# Patient Record
Sex: MALE | Race: ASIAN | NOT HISPANIC OR LATINO
[De-identification: names, ages, dates, MRNs, and addresses within clinical notes are randomized per-mention and may not be internally consistent; named-entity substitution may affect disease eponyms.]

---

## 2017-09-25 ENCOUNTER — APPOINTMENT (OUTPATIENT)
Dept: CARDIOLOGY | Facility: CLINIC | Age: 62
End: 2017-09-25

## 2017-11-14 ENCOUNTER — INPATIENT (INPATIENT)
Facility: HOSPITAL | Age: 62
LOS: 0 days | Discharge: HOME | DRG: 287 | End: 2017-11-15
Attending: INTERNAL MEDICINE | Admitting: HOSPITALIST
Payer: COMMERCIAL

## 2017-11-14 VITALS
OXYGEN SATURATION: 99 % | HEART RATE: 71 BPM | SYSTOLIC BLOOD PRESSURE: 146 MMHG | RESPIRATION RATE: 16 BRPM | TEMPERATURE: 98 F | DIASTOLIC BLOOD PRESSURE: 90 MMHG

## 2017-11-14 DIAGNOSIS — I25.10 ATHEROSCLEROTIC HEART DISEASE OF NATIVE CORONARY ARTERY WITHOUT ANGINA PECTORIS: Chronic | ICD-10-CM

## 2017-11-14 DIAGNOSIS — I20.8 OTHER FORMS OF ANGINA PECTORIS: ICD-10-CM

## 2017-11-14 LAB
ALBUMIN SERPL ELPH-MCNC: 4.1 G/DL — SIGNIFICANT CHANGE UP (ref 3.3–5)
ALP SERPL-CCNC: 83 U/L — SIGNIFICANT CHANGE UP (ref 40–120)
ALT FLD-CCNC: 15 U/L RC — SIGNIFICANT CHANGE UP (ref 10–45)
ANION GAP SERPL CALC-SCNC: 13 MMOL/L — SIGNIFICANT CHANGE UP (ref 5–17)
APTT BLD: 39.7 SEC — HIGH (ref 27.5–37.4)
AST SERPL-CCNC: 20 U/L — SIGNIFICANT CHANGE UP (ref 10–40)
BASOPHILS # BLD AUTO: 0 K/UL — SIGNIFICANT CHANGE UP (ref 0–0.2)
BASOPHILS NFR BLD AUTO: 0.3 % — SIGNIFICANT CHANGE UP (ref 0–2)
BILIRUB SERPL-MCNC: 1 MG/DL — SIGNIFICANT CHANGE UP (ref 0.2–1.2)
BUN SERPL-MCNC: 14 MG/DL — SIGNIFICANT CHANGE UP (ref 7–23)
CALCIUM SERPL-MCNC: 9 MG/DL — SIGNIFICANT CHANGE UP (ref 8.4–10.5)
CHLORIDE SERPL-SCNC: 101 MMOL/L — SIGNIFICANT CHANGE UP (ref 96–108)
CK MB CFR SERPL CALC: <1 NG/ML — SIGNIFICANT CHANGE UP (ref 0–6.7)
CK SERPL-CCNC: 74 U/L — SIGNIFICANT CHANGE UP (ref 30–200)
CO2 SERPL-SCNC: 25 MMOL/L — SIGNIFICANT CHANGE UP (ref 22–31)
CREAT SERPL-MCNC: 1.03 MG/DL — SIGNIFICANT CHANGE UP (ref 0.5–1.3)
EOSINOPHIL # BLD AUTO: 0.2 K/UL — SIGNIFICANT CHANGE UP (ref 0–0.5)
EOSINOPHIL NFR BLD AUTO: 2.1 % — SIGNIFICANT CHANGE UP (ref 0–6)
GLUCOSE SERPL-MCNC: 121 MG/DL — HIGH (ref 70–99)
HCT VFR BLD CALC: 42.8 % — SIGNIFICANT CHANGE UP (ref 39–50)
HGB BLD-MCNC: 14.3 G/DL — SIGNIFICANT CHANGE UP (ref 13–17)
INR BLD: 1.9 RATIO — HIGH (ref 0.88–1.16)
LYMPHOCYTES # BLD AUTO: 19.5 % — SIGNIFICANT CHANGE UP (ref 13–44)
LYMPHOCYTES # BLD AUTO: 2 K/UL — SIGNIFICANT CHANGE UP (ref 1–3.3)
MAGNESIUM SERPL-MCNC: 1.6 MG/DL — SIGNIFICANT CHANGE UP (ref 1.6–2.6)
MCHC RBC-ENTMCNC: 31.8 PG — SIGNIFICANT CHANGE UP (ref 27–34)
MCHC RBC-ENTMCNC: 33.6 GM/DL — SIGNIFICANT CHANGE UP (ref 32–36)
MCV RBC AUTO: 94.7 FL — SIGNIFICANT CHANGE UP (ref 80–100)
MONOCYTES # BLD AUTO: 0.8 K/UL — SIGNIFICANT CHANGE UP (ref 0–0.9)
MONOCYTES NFR BLD AUTO: 7.7 % — SIGNIFICANT CHANGE UP (ref 2–14)
NEUTROPHILS # BLD AUTO: 7.4 K/UL — SIGNIFICANT CHANGE UP (ref 1.8–7.4)
NEUTROPHILS NFR BLD AUTO: 70.4 % — SIGNIFICANT CHANGE UP (ref 43–77)
NT-PROBNP SERPL-SCNC: 637 PG/ML — HIGH (ref 0–300)
PHOSPHATE SERPL-MCNC: 3.4 MG/DL — SIGNIFICANT CHANGE UP (ref 2.5–4.5)
PLATELET # BLD AUTO: 179 K/UL — SIGNIFICANT CHANGE UP (ref 150–400)
POTASSIUM SERPL-MCNC: 3.9 MMOL/L — SIGNIFICANT CHANGE UP (ref 3.5–5.3)
POTASSIUM SERPL-SCNC: 3.9 MMOL/L — SIGNIFICANT CHANGE UP (ref 3.5–5.3)
PROT SERPL-MCNC: 7.3 G/DL — SIGNIFICANT CHANGE UP (ref 6–8.3)
PROTHROM AB SERPL-ACNC: 21 SEC — HIGH (ref 9.8–12.7)
RBC # BLD: 4.52 M/UL — SIGNIFICANT CHANGE UP (ref 4.2–5.8)
RBC # FLD: 12.2 % — SIGNIFICANT CHANGE UP (ref 10.3–14.5)
SODIUM SERPL-SCNC: 139 MMOL/L — SIGNIFICANT CHANGE UP (ref 135–145)
TROPONIN T SERPL-MCNC: <0.01 NG/ML — SIGNIFICANT CHANGE UP (ref 0–0.06)
WBC # BLD: 10.4 K/UL — SIGNIFICANT CHANGE UP (ref 3.8–10.5)
WBC # FLD AUTO: 10.4 K/UL — SIGNIFICANT CHANGE UP (ref 3.8–10.5)

## 2017-11-14 PROCEDURE — 99285 EMERGENCY DEPT VISIT HI MDM: CPT

## 2017-11-14 PROCEDURE — 99406 BEHAV CHNG SMOKING 3-10 MIN: CPT

## 2017-11-14 PROCEDURE — 93308 TTE F-UP OR LMTD: CPT | Mod: 26

## 2017-11-14 PROCEDURE — 99223 1ST HOSP IP/OBS HIGH 75: CPT | Mod: 25

## 2017-11-14 PROCEDURE — 71020: CPT | Mod: 26

## 2017-11-14 RX ORDER — MAGNESIUM OXIDE 400 MG ORAL TABLET 241.3 MG
400 TABLET ORAL ONCE
Qty: 0 | Refills: 0 | Status: COMPLETED | OUTPATIENT
Start: 2017-11-14 | End: 2017-11-14

## 2017-11-14 RX ORDER — ASPIRIN/CALCIUM CARB/MAGNESIUM 324 MG
324 TABLET ORAL DAILY
Qty: 0 | Refills: 0 | Status: DISCONTINUED | OUTPATIENT
Start: 2017-11-14 | End: 2017-11-15

## 2017-11-14 RX ORDER — POTASSIUM CHLORIDE 20 MEQ
20 PACKET (EA) ORAL ONCE
Qty: 0 | Refills: 0 | Status: COMPLETED | OUTPATIENT
Start: 2017-11-14 | End: 2017-11-14

## 2017-11-14 RX ORDER — TICAGRELOR 90 MG/1
90 TABLET ORAL
Qty: 0 | Refills: 0 | Status: DISCONTINUED | OUTPATIENT
Start: 2017-11-14 | End: 2017-11-15

## 2017-11-14 RX ORDER — FUROSEMIDE 40 MG
20 TABLET ORAL ONCE
Qty: 0 | Refills: 0 | Status: COMPLETED | OUTPATIENT
Start: 2017-11-14 | End: 2017-11-14

## 2017-11-14 RX ORDER — SODIUM CHLORIDE 9 MG/ML
3 INJECTION INTRAMUSCULAR; INTRAVENOUS; SUBCUTANEOUS ONCE
Qty: 0 | Refills: 0 | Status: COMPLETED | OUTPATIENT
Start: 2017-11-14 | End: 2017-11-14

## 2017-11-14 RX ADMIN — Medication 20 MILLIEQUIVALENT(S): at 19:58

## 2017-11-14 RX ADMIN — Medication 324 MILLIGRAM(S): at 19:58

## 2017-11-14 RX ADMIN — MAGNESIUM OXIDE 400 MG ORAL TABLET 400 MILLIGRAM(S): 241.3 TABLET ORAL at 20:08

## 2017-11-14 RX ADMIN — Medication 20 MILLIGRAM(S): at 21:50

## 2017-11-14 RX ADMIN — SODIUM CHLORIDE 3 MILLILITER(S): 9 INJECTION INTRAMUSCULAR; INTRAVENOUS; SUBCUTANEOUS at 20:08

## 2017-11-14 RX ADMIN — TICAGRELOR 90 MILLIGRAM(S): 90 TABLET ORAL at 19:58

## 2017-11-14 NOTE — ED PROVIDER NOTE - MEDICAL DECISION MAKING DETAILS
61 y/o male h/o CAD with multiple stents presenting with worsening angina and afib. pt on xarelto. no pleuritic pain and as pt on xarelto less likely acute PE. ekgshows now evidence of acute st changes. d/w cards fellow will admit for catherization tomorrow. will admit to tele

## 2017-11-14 NOTE — ED ADULT NURSE NOTE - OBJECTIVE STATEMENT
62y m pt c/o chest pain and neck tightness x  1weelk; pt had nuclear stress test x 1 week; was due for further testing but pain not alleviated; pt states feels exactly like last year when had stents placed; ekg done; pt in afib; aox3; no resp distress; no abd pain; no n/v/d; no fever/chills; + bilat le edema, pt states no worse than normal; no numbness/tingling; pt ambulates without assist; skin warm dry intact; pt placed on cardiac monitor; afib; iv placed; labs drawn per md order; safety and comfort maintained

## 2017-11-14 NOTE — ED PROVIDER NOTE - OBJECTIVE STATEMENT
61 y/o M pt with PMHx of DM, PSHx of cardiac stents x6 c/o neck tightness and SOB x1 week with walking. Sx is better when he stops walking. Dr. Moran wanted to complete a stress test. Pt went into A-fib during the test. Pt started Xarelto 11/9.  Also states leg swelling which is improved in the mornings. Pt is a dentist. Denies fever, chills, vomiting, diarrhea or any other complaints.  Current medication: Brilinta,   Cardiologist; Dr. Cameron Moran.

## 2017-11-14 NOTE — CONSULT NOTE ADULT - ASSESSMENT
62M with HTN, DM, CRISS on CPAP, CAD s/p RCA LIU x 6 in 2011 and most recently in 2016 OM1 LIU with chest pain and Afib.    -- NPO p MN for cath  -- continue asa  -- continue brillinta  -- hold xarelto  -- reasonable to start heparin gtt prior to cath given low INR and afib  -- monitor on tele  -- replete K>4, Mg>2  -- continue atorvastatin 80 mg    Dickson Dent MD 62M with HTN, DM, CRISS on CPAP, CAD s/p RCA LIU x 6 in 2011 and most recently in 2016 OM1 LIU with chest pain and Afib.    -- consider CT chest for further delineation of CXR findings  -- consider TB isolation given recent [place] trip  -- NPO p MN for cath  -- continue asa  -- continue brillinta  -- hold xarelto  -- reasonable to start heparin gtt prior to cath given low INR and afib  -- monitor on tele  -- replete K>4, Mg>2  -- continue atorvastatin 80 mg    Dickson Dent MD

## 2017-11-14 NOTE — CONSULT NOTE ADULT - SUBJECTIVE AND OBJECTIVE BOX
Patient seen and evaluated @ 8 PM  Chief Complaint: Chest pain    HPI:  62M with HTN, DM, CRISS on CPAP, CAD s/p RCA LIU x 6 in 2011 and most recently in 2016 OM1 LIU  presents with persistent chest heaviness for 3 weeks now worsening associated with palpitations and SOB. Patient also endorses recent diagnosis of afib for which he started xarelto on 11/9 with improvement in symptoms    PMH:   CRISS (obstructive sleep apnea)  Hyperlipidemia, unspecified hyperlipidemia type  Essential hypertension  Coronary artery disease involving native coronary artery of native heart, angina presence unspecified  Type 2 diabetes mellitus without complication, without long-term current use of insulin  Diphtheria  Typhoid Fever  Obstructive Sleep Apnea  Obesity, unspecified  Hyperlipemia  Hypertension    PSH:   CAD (coronary artery disease)  No significant past surgical history    Home meds:  Metoprolol 100 mg  Brilinta 90 mg  Valsartan 160 mg  Metformin  Diltiazem  Atorvastatin  Xarelto    Medications:   aspirin  chewable 324 milliGRAM(s) Oral daily  ticagrelor 90 milliGRAM(s) Oral two times a day    Allergies:  No Known Drug Allergies  shellfish (Other)    FAMILY HISTORY:  Family history of stroke  Family history of hypertension  Family history of acute myocardial infarction: x4    Social History:  Current smoker    Review of Systems:  Constitutional: [ ] Fever [ ] Chills [ ] Fatigue [ ] Weight change   HEENT: [ ] Blurred vision [ ] Eye Pain [ ] Headache [ ] Runny nose [ ] Sore Throat   Respiratory: [ ] Cough [ ] Wheezing [ ] Shortness of breath  Cardiovascular: [ ] Chest Pain [ ] Palpitations [ ] LOPEZ [ ] PND [ ] Orthopnea  Gastrointestinal: [ ] Abdominal Pain [ ] Diarrhea [ ] Constipation [ ] Hemorrhoids [ ] Nausea [ ] Vomiting  Genitourinary: [ ] Nocturia [ ] Dysuria [ ] Incontinence  Extremities: [ ] Swelling [ ] Joint Pain  Neurologic: [ ] Focal deficit [ ] Paresthesias [ ] Syncope  Lymphatic: [ ] Swelling [ ] Lymphadenopathy   Skin: [ ] Rash [ ] Ecchymoses [ ] Wounds [ ] Lesions  Psychiatry: [ ] Depression [ ] Suicidal/Homicidal Ideation [ ] Anxiety [ ] Sleep Disturbances  [ ] 10 point review of systems is otherwise negative except as mentioned above            [ ]Unable to obtain    Physical Exam:  T(C): 36.8 (11-14-17 @ 19:10), Max: 36.8 (11-14-17 @ 18:58)  HR: 74 (11-14-17 @ 19:10) (71 - 74)  BP: 124/79 (11-14-17 @ 19:10) (124/79 - 146/79)  RR: 16 (11-14-17 @ 19:10) (16 - 16)  SpO2: 99% (11-14-17 @ 19:10) (98% - 99%)  Wt(kg): --    Daily     Daily     Appearance: NAD  Eyes: PERRL, EOMI  HENT: Normal oral mus[place]a, NC/AT  Cardiovascular: normal S1 and S2, irr irr, no m/r/g, no edema, normal JVP  Respiratory: Clear to auscultation bilaterally  Gastrointestinal: Soft, non-tender, non-distended, BS+  Musculoskeletal: No clubbing, no joint deformity   Neurologic: Non-focal  Lymphatic: No lymphadenopathy  Psychiatry: AAOx3, mood & affect appropriate  Skin: No rashes, no ecchymoses, no cyanosis    Cardiovascular Diagnostic Testing:  ECG: afib 70 bpm    Echo:  11/2016  EF 65%  1. Mild segmental abnormalities with normal left  ventricular systolic function. The mid to basal lateral  wall are hypokinetic.  No left ventricular thrombus.  2. Mild diastolic dysfunction (Stage I).  3. The right ventricle is not well visualized; grossly  normal right ventricular systolic function.  4. No pericardial effusion seen.    Stress Testing:  none    Cath:  11/11/2016  VENTRICLES: EF estimated was 49 %.  CORONARY VESSELS: The coronary circulation is right dominant.  LM:   --  LM: Normal.  LAD:   --  Proximal LAD: There was a 10 % stenosis at the site ofa prior  stent.  CX:   --  OM1: There was a 90 % stenosis.  RCA:   --  Proximal RCA: There was a 10 % stenosis at the site of a prior  stent.  --  Distal RCA: There was a 10 % stenosis at the site of a prior stent.  COMPLICATIONS: There were no complications.  DIAGNOSTIC RECOMMENDATIONS: ASA and Plavix for 1 year.  INTERVENTIONAL RECOMMENDATIONS: ASA and Plavix for 1 year.    Interpretation of Telemetry:  none    Imaging:  CXR with bilateral patchy opacities    Labs:                        14.3   10.4  )-----------( 179      ( 14 Nov 2017 18:44 )             42.8     11-14    139  |  101  |  14  ----------------------------<  121<H>  3.9   |  25  |  1.03    Ca    9.0      14 Nov 2017 18:44  Phos  3.4     11-14  Mg     1.6     11-14    TPro  7.3  /  Alb  4.1  /  TBili  1.0  /  DBili  x   /  AST  20  /  ALT  15  /  AlkPhos  83  11-14    PT/INR - ( 14 Nov 2017 18:44 )   PT: 21.0 sec;   INR: 1.90 ratio         PTT - ( 14 Nov 2017 18:44 )  PTT:39.7 sec  CARDIAC MARKERS ( 14 Nov 2017 18:44 )  x     / <0.01 ng/mL / 74 U/L / x     / <1.0 ng/mL      Serum Pro-Brain Natriuretic Peptide: 637 pg/mL (11-14 @ 18:44) Patient seen and evaluated @ 8 PM  Chief Complaint: Chest pain    HPI:  62M with HTN, DM, CRISS on CPAP, CAD s/p LIU to PDA, pRCA, mLAD, D1 in 2011 and most recently in 2016 OM1 LIU  presents with persistent chest heaviness for 3 weeks now worsening associated with palpitations and SOB. Patient also endorses recent diagnosis of afib for which he started xarelto on 11/9 with improvement in symptoms    PMH:   CRISS (obstructive sleep apnea)  Hyperlipidemia, unspecified hyperlipidemia type  Essential hypertension  Coronary artery disease involving native coronary artery of native heart, angina presence unspecified  Type 2 diabetes mellitus without complication, without long-term current use of insulin  Diphtheria  Typhoid Fever  Obstructive Sleep Apnea  Obesity, unspecified  Hyperlipemia  Hypertension    PSH:   CAD (coronary artery disease)  No significant past surgical history    Home meds:  Metoprolol 100 mg  Brilinta 90 mg  Valsartan 160 mg  Metformin  Diltiazem  Atorvastatin  Xarelto    Medications:   aspirin  chewable 324 milliGRAM(s) Oral daily  ticagrelor 90 milliGRAM(s) Oral two times a day    Allergies:  No Known Drug Allergies  shellfish (Other)    FAMILY HISTORY:  Family history of stroke  Family history of hypertension  Family history of acute myocardial infarction: x4    Social History:  Current smoker    Review of Systems:  Constitutional: [ ] Fever [ ] Chills [ ] Fatigue [ ] Weight change   HEENT: [ ] Blurred vision [ ] Eye Pain [ ] Headache [ ] Runny nose [ ] Sore Throat   Respiratory: [ ] Cough [ ] Wheezing [ ] Shortness of breath  Cardiovascular: [ ] Chest Pain [ ] Palpitations [ ] LOPEZ [ ] PND [ ] Orthopnea  Gastrointestinal: [ ] Abdominal Pain [ ] Diarrhea [ ] Constipation [ ] Hemorrhoids [ ] Nausea [ ] Vomiting  Genitourinary: [ ] Nocturia [ ] Dysuria [ ] Incontinence  Extremities: [ ] Swelling [ ] Joint Pain  Neurologic: [ ] Focal deficit [ ] Paresthesias [ ] Syncope  Lymphatic: [ ] Swelling [ ] Lymphadenopathy   Skin: [ ] Rash [ ] Ecchymoses [ ] Wounds [ ] Lesions  Psychiatry: [ ] Depression [ ] Suicidal/Homicidal Ideation [ ] Anxiety [ ] Sleep Disturbances  [ ] 10 point review of systems is otherwise negative except as mentioned above            [ ]Unable to obtain    Physical Exam:  T(C): 36.8 (11-14-17 @ 19:10), Max: 36.8 (11-14-17 @ 18:58)  HR: 74 (11-14-17 @ 19:10) (71 - 74)  BP: 124/79 (11-14-17 @ 19:10) (124/79 - 146/79)  RR: 16 (11-14-17 @ 19:10) (16 - 16)  SpO2: 99% (11-14-17 @ 19:10) (98% - 99%)  Wt(kg): --    Daily     Daily     Appearance: NAD  Eyes: PERRL, EOMI  HENT: Normal oral mus[place]a, NC/AT  Cardiovascular: normal S1 and S2, irr irr, no m/r/g, no edema, normal JVP  Respiratory: Clear to auscultation bilaterally  Gastrointestinal: Soft, non-tender, non-distended, BS+  Musculoskeletal: No clubbing, no joint deformity   Neurologic: Non-focal  Lymphatic: No lymphadenopathy  Psychiatry: AAOx3, mood & affect appropriate  Skin: No rashes, no ecchymoses, no cyanosis    Cardiovascular Diagnostic Testing:  ECG: afib 70 bpm    Echo:  11/2016  EF 65%  1. Mild segmental abnormalities with normal left  ventricular systolic function. The mid to basal lateral  wall are hypokinetic.  No left ventricular thrombus.  2. Mild diastolic dysfunction (Stage I).  3. The right ventricle is not well visualized; grossly  normal right ventricular systolic function.  4. No pericardial effusion seen.    Stress Testing:  none    Cath:  11/11/2016  VENTRICLES: EF estimated was 49 %.  CORONARY VESSELS: The coronary circulation is right dominant.  LM:   --  LM: Normal.  LAD:   --  Proximal LAD: There was a 10 % stenosis at the site ofa prior  stent.  CX:   --  OM1: There was a 90 % stenosis.  RCA:   --  Proximal RCA: There was a 10 % stenosis at the site of a prior  stent.  --  Distal RCA: There was a 10 % stenosis at the site of a prior stent.  COMPLICATIONS: There were no complications.  DIAGNOSTIC RECOMMENDATIONS: ASA and Plavix for 1 year.  INTERVENTIONAL RECOMMENDATIONS: ASA and Plavix for 1 year.    Interpretation of Telemetry:  none    Imaging:  CXR with bilateral patchy opacities    Labs:                        14.3   10.4  )-----------( 179      ( 14 Nov 2017 18:44 )             42.8     11-14    139  |  101  |  14  ----------------------------<  121<H>  3.9   |  25  |  1.03    Ca    9.0      14 Nov 2017 18:44  Phos  3.4     11-14  Mg     1.6     11-14    TPro  7.3  /  Alb  4.1  /  TBili  1.0  /  DBili  x   /  AST  20  /  ALT  15  /  AlkPhos  83  11-14    PT/INR - ( 14 Nov 2017 18:44 )   PT: 21.0 sec;   INR: 1.90 ratio         PTT - ( 14 Nov 2017 18:44 )  PTT:39.7 sec  CARDIAC MARKERS ( 14 Nov 2017 18:44 )  x     / <0.01 ng/mL / 74 U/L / x     / <1.0 ng/mL      Serum Pro-Brain Natriuretic Peptide: 637 pg/mL (11-14 @ 18:44)

## 2017-11-14 NOTE — ED ADULT NURSE REASSESSMENT NOTE - NS ED NURSE REASSESS COMMENT FT1
Patient sitting in [place] on tele monitor. He states his pain is always there, but has neck pain with walking around. He received medications. Spoke with MD Almeida and she states patient is allowed to eat. He is to be admitted. Will continue to monitor.

## 2017-11-14 NOTE — ED PROVIDER NOTE - CARE PLAN
Principal Discharge DX:	Angina effort  Secondary Diagnosis:	Shortness of breath  Secondary Diagnosis:	Atrial fibrillation

## 2017-11-14 NOTE — ED PROVIDER NOTE - PMH
Coronary artery disease involving native coronary artery of native heart, angina presence unspecified    Diphtheria  at 13yo  Essential hypertension    Hyperlipemia    Hyperlipidemia, unspecified hyperlipidemia type    Hypertension    Obesity, unspecified    Obstructive Sleep Apnea    CRISS (obstructive sleep apnea)    Type 2 diabetes mellitus without complication, without long-term current use of insulin    Typhoid Fever  at 21yo

## 2017-11-14 NOTE — ED ADULT NURSE NOTE - PMH
Coronary artery disease involving native coronary artery of native heart, angina presence unspecified    Diphtheria  at 15yo  Essential hypertension    Hyperlipemia    Hyperlipidemia, unspecified hyperlipidemia type    Hypertension    Obesity, unspecified    Obstructive Sleep Apnea    CRISS (obstructive sleep apnea)    Type 2 diabetes mellitus without complication, without long-term current use of insulin    Typhoid Fever  at 19yo

## 2017-11-14 NOTE — ED PROVIDER NOTE - PROGRESS NOTE DETAILS
spoke with cardiology fellow and made aware of pt's visit d/w cards fellow. pt to be cath tomorrow. pt can take brilinta this evening but would recommend holding xarelto.admit to medicine Attending Juan Pablo: pt states pcp is not with [facility]. will admit to unattached

## 2017-11-15 ENCOUNTER — TRANSCRIPTION ENCOUNTER (OUTPATIENT)
Age: 62
End: 2017-11-15

## 2017-11-15 VITALS — DIASTOLIC BLOOD PRESSURE: 87 MMHG | HEART RATE: 84 BPM | SYSTOLIC BLOOD PRESSURE: 129 MMHG

## 2017-11-15 DIAGNOSIS — Z29.9 ENCOUNTER FOR PROPHYLACTIC MEASURES, UNSPECIFIED: ICD-10-CM

## 2017-11-15 DIAGNOSIS — I47.2 VENTRICULAR TACHYCARDIA: ICD-10-CM

## 2017-11-15 DIAGNOSIS — I25.10 ATHEROSCLEROTIC HEART DISEASE OF NATIVE CORONARY ARTERY WITHOUT ANGINA PECTORIS: ICD-10-CM

## 2017-11-15 DIAGNOSIS — I10 ESSENTIAL (PRIMARY) HYPERTENSION: ICD-10-CM

## 2017-11-15 DIAGNOSIS — I20.0 UNSTABLE ANGINA: ICD-10-CM

## 2017-11-15 DIAGNOSIS — F17.210 NICOTINE DEPENDENCE, CIGARETTES, UNCOMPLICATED: ICD-10-CM

## 2017-11-15 DIAGNOSIS — R91.8 OTHER NONSPECIFIC ABNORMAL FINDING OF LUNG FIELD: ICD-10-CM

## 2017-11-15 DIAGNOSIS — I48.0 PAROXYSMAL ATRIAL FIBRILLATION: ICD-10-CM

## 2017-11-15 DIAGNOSIS — E11.65 TYPE 2 DIABETES MELLITUS WITH HYPERGLYCEMIA: ICD-10-CM

## 2017-11-15 LAB
ALBUMIN SERPL ELPH-MCNC: 3.5 G/DL — SIGNIFICANT CHANGE UP (ref 3.3–5)
ALP SERPL-CCNC: 74 U/L — SIGNIFICANT CHANGE UP (ref 40–120)
ALT FLD-CCNC: 14 U/L — SIGNIFICANT CHANGE UP (ref 10–45)
ANION GAP SERPL CALC-SCNC: 16 MMOL/L — SIGNIFICANT CHANGE UP (ref 5–17)
APTT BLD: 171.5 SEC — CRITICAL HIGH (ref 27.5–37.4)
AST SERPL-CCNC: 32 U/L — SIGNIFICANT CHANGE UP (ref 10–40)
BASOPHILS # BLD AUTO: 0.02 K/UL — SIGNIFICANT CHANGE UP (ref 0–0.2)
BASOPHILS NFR BLD AUTO: 0.2 % — SIGNIFICANT CHANGE UP (ref 0–2)
BILIRUB SERPL-MCNC: 1.1 MG/DL — SIGNIFICANT CHANGE UP (ref 0.2–1.2)
BUN SERPL-MCNC: 11 MG/DL — SIGNIFICANT CHANGE UP (ref 7–23)
CALCIUM SERPL-MCNC: 8.9 MG/DL — SIGNIFICANT CHANGE UP (ref 8.4–10.5)
CHLORIDE SERPL-SCNC: 101 MMOL/L — SIGNIFICANT CHANGE UP (ref 96–108)
CK MB BLD-MCNC: 1.4 % — SIGNIFICANT CHANGE UP (ref 0–3.5)
CK MB CFR SERPL CALC: 1 NG/ML — SIGNIFICANT CHANGE UP (ref 0–6.7)
CK SERPL-CCNC: 72 U/L — SIGNIFICANT CHANGE UP (ref 30–200)
CO2 SERPL-SCNC: 20 MMOL/L — LOW (ref 22–31)
CREAT SERPL-MCNC: 0.81 MG/DL — SIGNIFICANT CHANGE UP (ref 0.5–1.3)
EOSINOPHIL # BLD AUTO: 0.26 K/UL — SIGNIFICANT CHANGE UP (ref 0–0.5)
EOSINOPHIL NFR BLD AUTO: 2.7 % — SIGNIFICANT CHANGE UP (ref 0–6)
GLUCOSE BLDC GLUCOMTR-MCNC: 118 MG/DL — HIGH (ref 70–99)
GLUCOSE BLDC GLUCOMTR-MCNC: 164 MG/DL — HIGH (ref 70–99)
GLUCOSE SERPL-MCNC: 107 MG/DL — HIGH (ref 70–99)
HBA1C BLD-MCNC: 7.7 % — HIGH (ref 4–5.6)
HCT VFR BLD CALC: 40.2 % — SIGNIFICANT CHANGE UP (ref 39–50)
HCT VFR BLD CALC: 44.5 % — SIGNIFICANT CHANGE UP (ref 39–50)
HGB BLD-MCNC: 13 G/DL — SIGNIFICANT CHANGE UP (ref 13–17)
HGB BLD-MCNC: 14.7 G/DL — SIGNIFICANT CHANGE UP (ref 13–17)
IMM GRANULOCYTES NFR BLD AUTO: 0.4 % — SIGNIFICANT CHANGE UP (ref 0–1.5)
LYMPHOCYTES # BLD AUTO: 2.23 K/UL — SIGNIFICANT CHANGE UP (ref 1–3.3)
LYMPHOCYTES # BLD AUTO: 23.2 % — SIGNIFICANT CHANGE UP (ref 13–44)
MAGNESIUM SERPL-MCNC: 1.8 MG/DL — SIGNIFICANT CHANGE UP (ref 1.6–2.6)
MCHC RBC-ENTMCNC: 29.3 PG — SIGNIFICANT CHANGE UP (ref 27–34)
MCHC RBC-ENTMCNC: 31.3 PG — SIGNIFICANT CHANGE UP (ref 27–34)
MCHC RBC-ENTMCNC: 32.3 GM/DL — SIGNIFICANT CHANGE UP (ref 32–36)
MCHC RBC-ENTMCNC: 33 GM/DL — SIGNIFICANT CHANGE UP (ref 32–36)
MCV RBC AUTO: 90.7 FL — SIGNIFICANT CHANGE UP (ref 80–100)
MCV RBC AUTO: 94.7 FL — SIGNIFICANT CHANGE UP (ref 80–100)
MONOCYTES # BLD AUTO: 0.83 K/UL — SIGNIFICANT CHANGE UP (ref 0–0.9)
MONOCYTES NFR BLD AUTO: 8.6 % — SIGNIFICANT CHANGE UP (ref 2–14)
NEUTROPHILS # BLD AUTO: 6.25 K/UL — SIGNIFICANT CHANGE UP (ref 1.8–7.4)
NEUTROPHILS NFR BLD AUTO: 64.9 % — SIGNIFICANT CHANGE UP (ref 43–77)
PHOSPHATE SERPL-MCNC: 3.6 MG/DL — SIGNIFICANT CHANGE UP (ref 2.5–4.5)
PLATELET # BLD AUTO: 180 K/UL — SIGNIFICANT CHANGE UP (ref 150–400)
PLATELET # BLD AUTO: 187 K/UL — SIGNIFICANT CHANGE UP (ref 150–400)
POTASSIUM SERPL-MCNC: 4.6 MMOL/L — SIGNIFICANT CHANGE UP (ref 3.5–5.3)
POTASSIUM SERPL-SCNC: 4.6 MMOL/L — SIGNIFICANT CHANGE UP (ref 3.5–5.3)
PROT SERPL-MCNC: 7.5 G/DL — SIGNIFICANT CHANGE UP (ref 6–8.3)
RBC # BLD: 4.43 M/UL — SIGNIFICANT CHANGE UP (ref 4.2–5.8)
RBC # BLD: 4.7 M/UL — SIGNIFICANT CHANGE UP (ref 4.2–5.8)
RBC # FLD: 12.3 % — SIGNIFICANT CHANGE UP (ref 10.3–14.5)
RBC # FLD: 13.5 % — SIGNIFICANT CHANGE UP (ref 10.3–14.5)
SODIUM SERPL-SCNC: 137 MMOL/L — SIGNIFICANT CHANGE UP (ref 135–145)
TROPONIN T SERPL-MCNC: <0.01 NG/ML — SIGNIFICANT CHANGE UP (ref 0–0.06)
WBC # BLD: 11.1 K/UL — HIGH (ref 3.8–10.5)
WBC # BLD: 9.63 K/UL — SIGNIFICANT CHANGE UP (ref 3.8–10.5)
WBC # FLD AUTO: 11.1 K/UL — HIGH (ref 3.8–10.5)
WBC # FLD AUTO: 9.63 K/UL — SIGNIFICANT CHANGE UP (ref 3.8–10.5)

## 2017-11-15 PROCEDURE — 83735 ASSAY OF MAGNESIUM: CPT

## 2017-11-15 PROCEDURE — C1894: CPT

## 2017-11-15 PROCEDURE — 99285 EMERGENCY DEPT VISIT HI MDM: CPT | Mod: 25

## 2017-11-15 PROCEDURE — 83036 HEMOGLOBIN GLYCOSYLATED A1C: CPT

## 2017-11-15 PROCEDURE — 83880 ASSAY OF NATRIURETIC PEPTIDE: CPT

## 2017-11-15 PROCEDURE — 82962 GLUCOSE BLOOD TEST: CPT

## 2017-11-15 PROCEDURE — 93308 TTE F-UP OR LMTD: CPT

## 2017-11-15 PROCEDURE — 85730 THROMBOPLASTIN TIME PARTIAL: CPT

## 2017-11-15 PROCEDURE — 93458 L HRT ARTERY/VENTRICLE ANGIO: CPT | Mod: 26

## 2017-11-15 PROCEDURE — 84100 ASSAY OF PHOSPHORUS: CPT

## 2017-11-15 PROCEDURE — 99239 HOSP IP/OBS DSCHRG MGMT >30: CPT

## 2017-11-15 PROCEDURE — 99152 MOD SED SAME PHYS/QHP 5/>YRS: CPT

## 2017-11-15 PROCEDURE — 82553 CREATINE MB FRACTION: CPT

## 2017-11-15 PROCEDURE — 71250 CT THORAX DX C-: CPT

## 2017-11-15 PROCEDURE — 86480 TB TEST CELL IMMUN MEASURE: CPT

## 2017-11-15 PROCEDURE — C1887: CPT

## 2017-11-15 PROCEDURE — 93458 L HRT ARTERY/VENTRICLE ANGIO: CPT

## 2017-11-15 PROCEDURE — 93005 ELECTROCARDIOGRAM TRACING: CPT

## 2017-11-15 PROCEDURE — 80053 COMPREHEN METABOLIC PANEL: CPT

## 2017-11-15 PROCEDURE — 82550 ASSAY OF CK (CPK): CPT

## 2017-11-15 PROCEDURE — C1769: CPT

## 2017-11-15 PROCEDURE — 71250 CT THORAX DX C-: CPT | Mod: 26

## 2017-11-15 PROCEDURE — 85027 COMPLETE CBC AUTOMATED: CPT

## 2017-11-15 PROCEDURE — 85610 PROTHROMBIN TIME: CPT

## 2017-11-15 PROCEDURE — 84484 ASSAY OF TROPONIN QUANT: CPT

## 2017-11-15 PROCEDURE — 71046 X-RAY EXAM CHEST 2 VIEWS: CPT

## 2017-11-15 RX ORDER — ATORVASTATIN CALCIUM 80 MG/1
80 TABLET, FILM COATED ORAL AT BEDTIME
Qty: 0 | Refills: 0 | Status: DISCONTINUED | OUTPATIENT
Start: 2017-11-15 | End: 2017-11-15

## 2017-11-15 RX ORDER — DEXTROSE 50 % IN WATER 50 %
12.5 SYRINGE (ML) INTRAVENOUS ONCE
Qty: 0 | Refills: 0 | Status: DISCONTINUED | OUTPATIENT
Start: 2017-11-15 | End: 2017-11-15

## 2017-11-15 RX ORDER — METOPROLOL TARTRATE 50 MG
100 TABLET ORAL
Qty: 0 | Refills: 0 | Status: DISCONTINUED | OUTPATIENT
Start: 2017-11-15 | End: 2017-11-15

## 2017-11-15 RX ORDER — DEXTROSE 50 % IN WATER 50 %
25 SYRINGE (ML) INTRAVENOUS ONCE
Qty: 0 | Refills: 0 | Status: DISCONTINUED | OUTPATIENT
Start: 2017-11-15 | End: 2017-11-15

## 2017-11-15 RX ORDER — FUROSEMIDE 40 MG
20 TABLET ORAL DAILY
Qty: 0 | Refills: 0 | Status: DISCONTINUED | OUTPATIENT
Start: 2017-11-15 | End: 2017-11-15

## 2017-11-15 RX ORDER — SODIUM CHLORIDE 9 MG/ML
1000 INJECTION, SOLUTION INTRAVENOUS
Qty: 0 | Refills: 0 | Status: DISCONTINUED | OUTPATIENT
Start: 2017-11-15 | End: 2017-11-15

## 2017-11-15 RX ORDER — TICAGRELOR 90 MG/1
90 TABLET ORAL
Qty: 0 | Refills: 0 | COMMUNITY

## 2017-11-15 RX ORDER — HEPARIN SODIUM 5000 [USP'U]/ML
4000 INJECTION INTRAVENOUS; SUBCUTANEOUS EVERY 6 HOURS
Qty: 0 | Refills: 0 | Status: DISCONTINUED | OUTPATIENT
Start: 2017-11-15 | End: 2017-11-15

## 2017-11-15 RX ORDER — MAGNESIUM SULFATE 500 MG/ML
1 VIAL (ML) INJECTION ONCE
Qty: 0 | Refills: 0 | Status: COMPLETED | OUTPATIENT
Start: 2017-11-15 | End: 2017-11-15

## 2017-11-15 RX ORDER — GLUCAGON INJECTION, SOLUTION 0.5 MG/.1ML
1 INJECTION, SOLUTION SUBCUTANEOUS ONCE
Qty: 0 | Refills: 0 | Status: DISCONTINUED | OUTPATIENT
Start: 2017-11-15 | End: 2017-11-15

## 2017-11-15 RX ORDER — DEXTROSE 50 % IN WATER 50 %
1 SYRINGE (ML) INTRAVENOUS ONCE
Qty: 0 | Refills: 0 | Status: DISCONTINUED | OUTPATIENT
Start: 2017-11-15 | End: 2017-11-15

## 2017-11-15 RX ORDER — FUROSEMIDE 40 MG
1 TABLET ORAL
Qty: 0 | Refills: 0 | COMMUNITY

## 2017-11-15 RX ORDER — TICAGRELOR 90 MG/1
0 TABLET ORAL
Qty: 0 | Refills: 0 | COMMUNITY

## 2017-11-15 RX ORDER — INSULIN LISPRO 100/ML
VIAL (ML) SUBCUTANEOUS AT BEDTIME
Qty: 0 | Refills: 0 | Status: DISCONTINUED | OUTPATIENT
Start: 2017-11-15 | End: 2017-11-15

## 2017-11-15 RX ORDER — ASPIRIN/CALCIUM CARB/MAGNESIUM 324 MG
81 TABLET ORAL DAILY
Qty: 0 | Refills: 0 | Status: DISCONTINUED | OUTPATIENT
Start: 2017-11-15 | End: 2017-11-15

## 2017-11-15 RX ORDER — FUROSEMIDE 40 MG
1 TABLET ORAL
Qty: 0 | Refills: 0 | DISCHARGE
Start: 2017-11-15

## 2017-11-15 RX ORDER — RIVAROXABAN 15 MG-20MG
0 KIT ORAL
Qty: 0 | Refills: 0 | COMMUNITY

## 2017-11-15 RX ORDER — HEPARIN SODIUM 5000 [USP'U]/ML
INJECTION INTRAVENOUS; SUBCUTANEOUS
Qty: 25000 | Refills: 0 | Status: DISCONTINUED | OUTPATIENT
Start: 2017-11-15 | End: 2017-11-15

## 2017-11-15 RX ORDER — HEPARIN SODIUM 5000 [USP'U]/ML
9000 INJECTION INTRAVENOUS; SUBCUTANEOUS EVERY 6 HOURS
Qty: 0 | Refills: 0 | Status: DISCONTINUED | OUTPATIENT
Start: 2017-11-15 | End: 2017-11-15

## 2017-11-15 RX ORDER — RIVAROXABAN 15 MG-20MG
1 KIT ORAL
Qty: 30 | Refills: 0
Start: 2017-11-15 | End: 2017-12-15

## 2017-11-15 RX ORDER — DILTIAZEM HCL 120 MG
240 CAPSULE, EXT RELEASE 24 HR ORAL DAILY
Qty: 0 | Refills: 0 | Status: DISCONTINUED | OUTPATIENT
Start: 2017-11-15 | End: 2017-11-15

## 2017-11-15 RX ORDER — RIVAROXABAN 15 MG-20MG
20 KIT ORAL
Qty: 0 | Refills: 0 | COMMUNITY

## 2017-11-15 RX ORDER — VALSARTAN 80 MG/1
160 TABLET ORAL DAILY
Qty: 0 | Refills: 0 | Status: DISCONTINUED | OUTPATIENT
Start: 2017-11-15 | End: 2017-11-15

## 2017-11-15 RX ORDER — INSULIN LISPRO 100/ML
VIAL (ML) SUBCUTANEOUS
Qty: 0 | Refills: 0 | Status: DISCONTINUED | OUTPATIENT
Start: 2017-11-15 | End: 2017-11-15

## 2017-11-15 RX ORDER — MAGNESIUM SULFATE 500 MG/ML
2 VIAL (ML) INJECTION ONCE
Qty: 0 | Refills: 0 | Status: COMPLETED | OUTPATIENT
Start: 2017-11-15 | End: 2017-11-15

## 2017-11-15 RX ORDER — LEVOTHYROXINE SODIUM 125 MCG
25 TABLET ORAL DAILY
Qty: 0 | Refills: 0 | Status: DISCONTINUED | OUTPATIENT
Start: 2017-11-15 | End: 2017-11-15

## 2017-11-15 RX ADMIN — Medication 1: at 17:45

## 2017-11-15 RX ADMIN — HEPARIN SODIUM 1900 UNIT(S)/HR: 5000 INJECTION INTRAVENOUS; SUBCUTANEOUS at 03:28

## 2017-11-15 RX ADMIN — Medication 100 MILLIGRAM(S): at 06:36

## 2017-11-15 RX ADMIN — TICAGRELOR 90 MILLIGRAM(S): 90 TABLET ORAL at 06:35

## 2017-11-15 RX ADMIN — Medication 81 MILLIGRAM(S): at 11:02

## 2017-11-15 RX ADMIN — Medication 20 MILLIGRAM(S): at 06:35

## 2017-11-15 RX ADMIN — Medication 240 MILLIGRAM(S): at 06:35

## 2017-11-15 RX ADMIN — Medication 50 GRAM(S): at 06:35

## 2017-11-15 RX ADMIN — TICAGRELOR 90 MILLIGRAM(S): 90 TABLET ORAL at 17:46

## 2017-11-15 RX ADMIN — Medication 25 MICROGRAM(S): at 06:36

## 2017-11-15 RX ADMIN — HEPARIN SODIUM 0 UNIT(S)/HR: 5000 INJECTION INTRAVENOUS; SUBCUTANEOUS at 11:03

## 2017-11-15 RX ADMIN — VALSARTAN 160 MILLIGRAM(S): 80 TABLET ORAL at 06:35

## 2017-11-15 RX ADMIN — Medication 100 GRAM(S): at 16:21

## 2017-11-15 RX ADMIN — Medication 100 MILLIGRAM(S): at 17:46

## 2017-11-15 NOTE — DISCHARGE NOTE ADULT - REASON FOR ADMISSION
admitted with shortness of breath on exertion and neck tightness, cardiac cath with mild coronary artery disease - CT chest with mild infiltrative changes around gallbladder & advised to have outpatient ultrasound abdomen or HIDA scan

## 2017-11-15 NOTE — H&P ADULT - ASSESSMENT
62 M PMH HTN, T2DM, CRSIS on CPAP, CAD s/p s/p LIU to PDA, pRCA, mLAD, D1 in 2011 and most recently in 2016 OM1 LIU, Afib on Xarelto, hypothyroidism, p/w 1-2 weeks of progressive LOPEZ, neck tightness.

## 2017-11-15 NOTE — H&P ADULT - NSHPPHYSICALEXAM_GEN_ALL_CORE
PHYSICAL EXAM:  GENERAL: NAD, well-groomed, well-developed  HEAD:  Atraumatic, Normocephalic  EYES: EOMI, PERRLA, conjunctiva and sclera clear  ENMT: No tonsillar erythema, exudates, or enlargement; Moist mucous membranes, Good dentition, No lesions  NECK: Supple, No JVD, Normal thyroid  CHEST/LUNG: Clear to percussion bilaterally; No rales, rhonchi, wheezing, or rubs  HEART: Regular rate and rhythm; No murmurs, rubs, or gallops, 1+ edema b/l LE  ABDOMEN: Soft, Nontender, Nondistended; Bowel sounds present  EXTREMITIES:  2+ Peripheral Pulses, No clubbing, cyanosis  LYMPH: No lymphadenopathy noted  SKIN: No rashes or lesions  NERVOUS SYSTEM:  Alert & Oriented X3, Good concentration; Motor Strength 5/5 B/L upper and lower extremities.

## 2017-11-15 NOTE — H&P ADULT - PROBLEM SELECTOR PLAN 1
-Pt with progressive symptoms that precluded completion of his stress test, negative cardiac biomarkers thus far.  -Cards eval appreciated; plan for cath in am, NPO p MN  -c/w aspirin, brilinta, statin, BB  -hold xarelto  -start hep gtt in setting of afib off xarelto (in prep for cath) and with subtherapeutic INR -Pt with progressive symptoms that precluded completion of his stress test, negative cardiac biomarkers thus far.  -Cards eval appreciated; plan for cath in am, NPO p MN  -c/w aspirin, brilinta, statin, BB  -hold xarelto  -start hep gtt in setting of afib off xarelto (in prep for cath) and with subtherapeutic INR: need weight documented in [facility] d/w RN

## 2017-11-15 NOTE — DISCHARGE NOTE ADULT - MEDICATION SUMMARY - MEDICATIONS TO CHANGE
I will SWITCH the dose or number of times a day I take the medications listed below when I get home from the hospital:    metoprolol tartrate 50 mg oral tablet  -- 1 tab(s) by mouth 2 times a day    Lasix 20 mg oral tablet  -- 1 tab(s) by mouth once a day, As Needed

## 2017-11-15 NOTE — H&P ADULT - NSHPSOCIALHISTORY_GEN_ALL_CORE
Social History:    Marital Status:  (x   )    (   ) Single    (   )    (  )   Occupation: dentist  Lives with: (  ) alone  (  ) children   ( x ) spouse   (  ) parents  (  ) other    Substance Use (street drugs): ( x ) never used  (  ) other:  Tobacco Usage:  (   ) never smoked   (   ) former smoker   (x   ) current smoker  (     ) pack year  (        ) last cigarette date  Alcohol Usage: denies

## 2017-11-15 NOTE — DISCHARGE NOTE ADULT - HOSPITAL COURSE
62 M PMH HTN, T2DM, CRISS on CPAP, CAD s/p s/p LIU to PDA, pRCA, mLAD, D1 in 2011 and most recently in 2016 OM1 LIU, Afib on Xarelto, hypothyroidism, p/w 1-2 weeks of progressive LOPEZ, neck tightness.     Problem/Plan - 1:  ·  Problem: Unstable angina.  Plan: -Cards eval appreciated; cath with non-obstructive disease, continue medical management.  -c/w aspirin, brilinta, statin, BB  -resume xarelto.      Problem/Plan - 2:  ·  Problem: Opacities of both lungs present on chest x-ray.  Plan: -Pt is PPD+ with travel to [place] but does not have symptoms suggestive of active TB  -can check quant gold with PCP and discuss possible treatment of latent TB.      Problem/Plan - 3:  ·  Problem: Type 2 diabetes mellitus with hyperglycemia, without long-term current use of insulin.  Plan: -resume oral hypoglycemics  -consistent carb diet.      Problem/Plan - 4:  ·  Problem: Paroxysmal atrial fibrillation.  Plan: -resume xarelto  -c/w diltiazem, BB.      Problem/Plan - 5:  ·  Problem: Essential hypertension.  Plan: -c/w diltiazem, bb, valsartan.      Problem/Plan - 6:  Problem: Cigarette nicotine dependence without complication. Plan: Pt has existing nicotine dependence with 20+ PPY of cigarettes  Risks of tobacco/nicotine usage discussed, including cardiac disease and cancer, as well as the effect on existing comorbidities.

## 2017-11-15 NOTE — PROVIDER CONTACT NOTE (CRITICAL VALUE NOTIFICATION) - ACTION/TREATMENT ORDERED:
Follow heparin nomogram. Hold heparin drip for 1 hour and restarts at 16 cc/hr as per ordered nomogram

## 2017-11-15 NOTE — DISCHARGE NOTE ADULT - SECONDARY DIAGNOSIS.
Atrial fibrillation Type 2 diabetes mellitus with hyperglycemia, without long-term current use of insulin Obstructive sleep apnea Coronary artery disease involving native coronary artery of native heart, angina presence unspecified Essential hypertension Gallbladder anomaly

## 2017-11-15 NOTE — DISCHARGE NOTE ADULT - CARE PROVIDER_API CALL
Alexy Ballesteros), Cardiovascular Disease; Interventional Cardiology  [address] [place] [address]  Phone: [phone]  Fax: [phone]

## 2017-11-15 NOTE — H&P ADULT - PROBLEM SELECTOR PLAN 4
-hold xarelto in prep for cath  -c/w diltiazem, BB  -start heparin gtt while off doac -hold xarelto in prep for cath  -c/w diltiazem, BB  -start heparin gtt while off doac, pending weight documentation in [place].

## 2017-11-15 NOTE — CHART NOTE - NSCHARTNOTEFT_GEN_A_CORE
Called by RN for episode of 8 beats of v-tach on telemetry. Pt seen and examined at bedside. Pt is A&Ox3, was sleeping at the time, denies having felt any symptoms. Denies any chest pain, palpitations, SOB, abdominal pain, headache or urinary symptoms.     Vital Signs Last 24 Hrs  T(C): 36.7 (15 Nov 2017 05:08), Max: 36.8 (14 Nov 2017 18:58)  T(F): 98.1 (15 Nov 2017 05:08), Max: 98.2 (14 Nov 2017 18:58)  HR: 61 (15 Nov 2017 05:08) (59 - 78)  BP: 145/82 (15 Nov 2017 05:08) (124/79 - 146/79)  BP(mean): --  RR: 18 (15 Nov 2017 05:08) (16 - 18)  SpO2: 98% (15 Nov 2017 05:08) (97% - 99%)                        14.3   10.4  )-----------( 179      ( 14 Nov 2017 18:44 )             42.8     11-14    139  |  101  |  14  ----------------------------<  121<H>  3.9   |  25  |  1.03    Ca    9.0      14 Nov 2017 18:44  Phos  3.4     11-14  Mg     1.6     11-14    TPro  7.3  /  Alb  4.1  /  TBili  1.0  /  DBili  x   /  AST  20  /  ALT  15  /  AlkPhos  83  11-14        PHYSICAL EXAM:     General: NAD, non-toxic appearance  Neuro: NC, AT, PERRLA, no focal deficits  CV: S1 S2 RRR  Resp: B/L Lungs CTA, nonlabored  Abd: soft, NT, ND, + BS X4 quadrants  Ext: no edema, +PP b/l LE, warm to touch     Assessment & Plan     62 M PMH HTN, T2DM, CRISS on CPAP, CAD s/p s/p LIU to PDA, pRCA, mLAD, D1 in 2011 and most recently in 2016 OM1 LIU, Afib on Xarelto, hypothyroidism, p/w 1-2 weeks of progressive LOPEZ, neck tightness. Pt had similar sx prior to a recent stress test in early November, which was stopped 2/2 development of Afib.  Pt was started on diltiazem for rate control and eventually on Xarelto.  He now has progressive sx as mentioned, also with +LE edema (pt unsure if his 16 hour work day on his feet and 4+ hour of commute daily are contributing to this)  He denies SOB at rest. Denies overt chest pain; symptoms are not exactly similar to prior angina sx during prior stents.  Denies n/v, jaw pain, back pain, abdominal pain.  Of note, pt has had Afib seen on telemetry during prior stents/caths but was not put on a/c until recently (11/9) after developing it paroxysmally during stress test.    Pt now with episode of nonsustained V-tach.    Will check electrolytes   Magnesium supplemented  Pt on metoprolol and Cardizem to be given now  D/W cardiology fellow     F/U with primary team in am    Yulia London, ANP-BC  [id number]

## 2017-11-15 NOTE — PROGRESS NOTE ADULT - PROBLEM SELECTOR PROBLEM 3
CAD (coronary artery disease)
Type 2 diabetes mellitus with hyperglycemia, without long-term current use of insulin

## 2017-11-15 NOTE — PROGRESS NOTE ADULT - PROBLEM SELECTOR PLAN 1
- continue Diltiazem 240 mg daily, Metoprolol 100 mg BID   - obtain EF from recent Echo ( - continue Diltiazem 240 mg daily, Metoprolol 100 mg BID   - TTE today to estimate EF - continue Diltiazem 240 mg daily, Metoprolol 100 mg BID

## 2017-11-15 NOTE — DISCHARGE NOTE ADULT - MEDICATION SUMMARY - MEDICATIONS TO STOP TAKING
I will STOP taking the medications listed below when I get home from the hospital:    Norvasc  -- 5 milligram(s) by mouth once a day    lisinopril 2.5 mg oral tablet  -- 1 tab(s) by mouth once a day

## 2017-11-15 NOTE — DISCHARGE NOTE ADULT - PLAN OF CARE
no reoccurrence of chest pain follow up with primary care physician within one week after discharge  cardiac healthy diet  weight loss  smoking cessation  do NOT restart Xarelto until tomorrow 11/16 evening  do NOT restart Metformin until 11/18 due to cardiac cath IV contrast dye Atrial fibrillation is the most common heart rhythm problem & has the risk of stroke & heart attack  It helps if you control your blood pressure, not drink more than 1-2 alcohol drinks per day, cut down on caffeine, getting treatment for over active thyroid gland, & getting exercise  Call your doctor if you feel your heart racing or beating unusually, chest tightness or pain, lightheaded, faint, shortness of breath especially with exercise  It is important to take your heart medication as prescribed  You are on Xarelto for anticoagulation  Xarelto/Rivaroxaban is used to thin the blood so clots will not form and to keep existing ones from getting bigger.  Take this medication daily as prescribed by your health care provider.  Take this medication with food to prevent upset stomach.  If you miss a dose call your health care provider or pharmacist right away.  Tell your doctor you use this drug before you have a spinal or epidural procedure  Tell dentists, surgeon, and other doctors that you use this drug.  You may bleed more easily.  Be careful and avoid injury.  Use a soft toothbrush and an electric razor. HgA1C this admission was 7.7%  Make sure you get your HgA1c checked every three months.  If you take oral diabetes medications, check your blood glucose two times a day.  It's important not to skip any meals.  Keep a log of your blood glucose results and always take it with you to your doctor appointments.  Keep a list of your current medications including injectables and over the counter medications and bring this medication list with you to all your doctor appointments.  If you have not seen your opthalmologist this year call for appointment.  Check your feet daily for redness, sores, or openings. Do not self treat. If no improvement in two days call your primary care physician for an appointment.  Low blood sugar (hypoglycemia) is a blood sugar below 70mg/dl. Check your blood sugar if you feel signs/symptoms of hypoglycemia. If your blood sugar is below 70 take 15 grams of carbohydrates (ex 4 oz of apple juice, 3-4 glucosr tablets, or 4-6 oz of regular soda) wait 15 minutes and repeat blood sugar to make sure it comes up above 70.  If your blood sugar is above 70 and you are due for a meal, have a meal.  If you are not due for a meal have a snack.  This snack helps keeps your blood sugar at a safe range. use home CPAP as directed and daily Coronary artery disease is a condition where the arteries the supply the heart muscle get clogges with fatty deposits & puts you at risk for a heart attack  Call your doctor if you have any new pain, pressure, or discomfort in the center of your chest, pain, tingling or discomfort in arms, back, neck, jaw, or stomach, shortness of breath, nausea, vomiting, burping or heartburn, sweating, cold and clammy skin, racing or abnormal heartbeat for more than 10 minutes or if they keep coming & going.  Call 911 and do not tr to get to hospital by care  You can help yourself with lefestyle changes (quitting smoking if you smoke), eat lots of fruits & vegetables & low fat dairy products, not a lot of meat & fatty foods, walk or some form of physical activity most days of the week, lose weight if you are overweight  Take your cardiac medication as prescribed to lower cholesterol, to lower blood pressure, aspirin to prevent blood clots, and diabetes control  Make sure to keep appointments with doctor for cardiac follow up care Follow up with your medical doctor to establish long term blood pressure treatment goals. mild infiltrative changes around gallbladder  you will need abdominal ultrasound or HIDA scan for follow up evaluation as outpatient

## 2017-11-15 NOTE — DISCHARGE NOTE ADULT - ADDITIONAL INSTRUCTIONS
follow up with primary care physician within one week after discharge  follow up with outpatient cardiology as directed

## 2017-11-15 NOTE — H&P ADULT - HISTORY OF PRESENT ILLNESS
62 M PMH HTN, T2DM, CRISS on CPAP, CAD s/p s/p LIU to PDA, pRCA, mLAD, D1 in 2011 and most recently in 2016 OM1 LIU, Afib on Xarelto, hypothyroidism, p/w 1-2 weeks of progressive LOPEZ, neck tightness. Pt had similar sx prior to a recent stress test in early November, which was stopped 2/2 development of Afib.  Pt was started on diltiazem for rate control and eventually on Xarelto.  He now has progressive sx as mentioned, also with +LE edema (pt unsure if his 16 hour work day on his feet and 4+ hour of commute daily are contributing to this)  He denies SOB at rest. Denies overt chest pain; symptoms are not exactly similar to prior angina sx during prior stents.  Denies n/v, jaw pain, back pain, abdominal pain.  Of note, pt has had Afib seen on telemetry during prior stents/caths but was not put on a/c until recently (11/9) after developing it paroxysmally during stress test.    VS: 98.1, 71, 146/90, 16, 98%RA.  Labs: cbc nondiagnostic, cmp with mild hyperglycemia otherwise nondiagnostic. First set cardiac enzymes wnl, and proBNP 637. CXR prelim read shows increased R lower thorax and left mid thorax patchy opacification.  Cards eval in ER.  Given lasix 20 iv, mag/potassium, aspirin/brilinta.    Regarding patchy opacification on CXR; pt notes that he has had a chronic cough that has resolved a few weeks to months ago; first noticed it during travel to [place] (he goes yearly) and attributed it to air pollution.  He says cough spontaneously resolved after he coughed up a gelatinous piece of mucous.  Has not had cough for weeks to months.  Pt notes he is persistently PPD + in setting of BCG vaccine.  Denies drenching night sweats, denies unintentional weight loss (has lost 7 pounds as part of his diet), and denies hemoptysis or lymphadenopathy.

## 2017-11-15 NOTE — DISCHARGE NOTE ADULT - PATIENT PORTAL LINK FT
“You can access the FollowHealth Patient Portal, offered by [facility], by registering with the following website: [email or web address]”

## 2017-11-15 NOTE — PROGRESS NOTE ADULT - SUBJECTIVE AND OBJECTIVE BOX
Patient is a 62y old  Male who presents with a chief complaint of       SUBJECTIVE / OVERNIGHT EVENTS:      MEDICATIONS  (STANDING):  aspirin  chewable 81 milliGRAM(s) Oral daily  atorvastatin 80 milliGRAM(s) Oral at bedtime  dextrose 5%. 1000 milliLiter(s) (50 mL/Hr) IV Continuous <Continuous>  dextrose 50% Injectable 12.5 Gram(s) IV Push once  dextrose 50% Injectable 25 Gram(s) IV Push once  dextrose 50% Injectable 25 Gram(s) IV Push once  diltiazem    milliGRAM(s) Oral daily  furosemide    Tablet 20 milliGRAM(s) Oral daily  heparin  Infusion.  Unit(s)/Hr (19 mL/Hr) IV Continuous <Continuous>  insulin lispro (HumaLOG) corrective regimen sliding scale   SubCutaneous three times a day before meals  insulin lispro (HumaLOG) corrective regimen sliding scale   SubCutaneous at bedtime  levothyroxine 25 MICROGram(s) Oral daily  magnesium sulfate  IVPB 1 Gram(s) IV Intermittent once  metoprolol     tartrate 100 milliGRAM(s) Oral two times a day  ticagrelor 90 milliGRAM(s) Oral two times a day  valsartan 160 milliGRAM(s) Oral daily    MEDICATIONS  (PRN):  dextrose Gel 1 Dose(s) Oral once PRN Blood Glucose LESS THAN 70 milliGRAM(s)/deciliter  glucagon  Injectable 1 milliGRAM(s) IntraMuscular once PRN Glucose LESS THAN 70 milligrams/deciliter  heparin  Injectable 9000 Unit(s) IV Push every 6 hours PRN For aPTT less than 40  heparin  Injectable 4000 Unit(s) IV Push every 6 hours PRN For aPTT between 40 - 57      Vital Signs Last 24 Hrs  T(C): 36.7 (15 Nov 2017 05:08), Max: 36.8 (14 Nov 2017 18:58)  T(F): 98.1 (15 Nov 2017 05:08), Max: 98.2 (14 Nov 2017 18:58)  HR: 72 (15 Nov 2017 06:40) (59 - 78)  BP: 126/76 (15 Nov 2017 06:40) (124/79 - 146/79)  BP(mean): --  RR: 18 (15 Nov 2017 05:08) (16 - 18)  SpO2: 98% (15 Nov 2017 05:08) (97% - 99%)  CAPILLARY BLOOD GLUCOSE      POCT Blood Glucose.: 118 mg/dL (15 Nov 2017 13:59)  POCT Blood Glucose.: 133 mg/dL (15 Nov 2017 07:47)    I&O's Summary    14 Nov 2017 07:01  -  15 Nov 2017 07:00  --------------------------------------------------------  IN: 66 mL / OUT: 0 mL / NET: 66 mL    15 Nov 2017 07:01  -  15 Nov 2017 15:35  --------------------------------------------------------  IN: 0 mL / OUT: 0 mL / NET: 0 mL          PHYSICAL EXAM  GENERAL: NAD, well-groomed, well-developed  HEAD:  Atraumatic, Normocephalic  EYES: EOMI, PERRLA, conjunctiva and sclera clear  ENMT: No tonsillar erythema, exudates, or enlargement; Moist mucous membranes, Good dentition, No lesions  NECK: Supple, No JVD, Normal thyroid  CHEST/LUNG: Clear to percussion bilaterally; No rales, rhonchi, wheezing, or rubs  HEART: Regular rate and rhythm; No murmurs, rubs, or gallops, 1+ edema b/l LE  ABDOMEN: Soft, Nontender, Nondistended; Bowel sounds present  EXTREMITIES:  2+ Peripheral Pulses, No clubbing, cyanosis      LABS:                        14.7   11.1  )-----------( 180      ( 15 Nov 2017 10:17 )             44.5     11-15    137  |  101  |  11  ----------------------------<  107<H>  4.6   |  20<L>  |  0.81    Ca    8.9      15 Nov 2017 07:37  Phos  3.6     11-15  Mg     1.8     11-15    TPro  7.5  /  Alb  3.5  /  TBili  1.1  /  DBili  x   /  AST  32  /  ALT  14  /  AlkPhos  74  11-15    PT/INR - ( 14 Nov 2017 18:44 )   PT: 21.0 sec;   INR: 1.90 ratio         PTT - ( 15 Nov 2017 10:17 )  PTT:171.5 sec  CARDIAC MARKERS ( 15 Nov 2017 01:42 )  x     / <0.01 ng/mL / 72 U/L / x     / 1.0 ng/mL  CARDIAC MARKERS ( 14 Nov 2017 18:44 )  x     / <0.01 ng/mL / 74 U/L / x     / <1.0 ng/mL            RADIOLOGY & ADDITIONAL TESTS:    Imaging Personally Reviewed:  Consultant(s) Notes Reviewed:    Care Discussed with Consultants/Other Providers:

## 2017-11-15 NOTE — H&P ADULT - ATTENDING COMMENTS
Case endorsed to NP at 1 am    Care to be assumed by day hospitalist in am    This patient was assigned to me by the hospitalist in charge; my involvement in this case has consisted of the initial history, physical, chart review, and management plan.  This patient was previously unknown to me. [place] [id number] Priyanka at 1 am; she is aware to get the weight documented in sunrise and start full dose heparin gtt afterwards.     Care to be assumed by day hospitalist in am    This patient was assigned to me by the hospitalist in charge; my involvement in this case has consisted of the initial history, physical, chart review, and management plan.  This patient was previously unknown to me. Case endorsed to ED NP [id number] Priyanka at 1 am; she is aware to get the weight documented in sunrise and start full dose heparin gtt afterwards.     Care to be assumed by day hospitalist in am    This patient was assigned to me by the hospitalist in charge; my involvement in this case has consisted of the initial history, physical, chart review, and management plan.  This patient was previously unknown to me.    Initial history and physical performed prior to midnight on 11/14/17

## 2017-11-15 NOTE — PROGRESS NOTE ADULT - ASSESSMENT
62 M PMH HTN, T2DM, CRISS on CPAP, CAD s/p s/p LIU to PDA, pRCA, mLAD, D1 in 2011 and most recently in 2016 OM1 LIU, Afib on Xarelto, hypothyroidism, p/w 1-2 weeks of progressive LOPEZ, neck tightness.

## 2017-11-15 NOTE — PROGRESS NOTE ADULT - PROBLEM SELECTOR PLAN 1
-Cards eval appreciated; cath with non-obstructive disease, continue medical management.  -c/w aspirin, brilinta, statin, BB  -resume xarelto

## 2017-11-15 NOTE — H&P ADULT - PROBLEM SELECTOR PLAN 6
start hep gtt Pt has existing nicotine dependence with 20+ PPY of cigarettes  5 minutes spent at bedside counseling on nicotine cessation.   Risks of tobacco/nicotine usage discussed, including cardiac disease and cancer, as well as the effect on existing comorbidities.   Patch offerred, patient declined

## 2017-11-15 NOTE — DISCHARGE NOTE ADULT - CARE PLAN
Principal Discharge DX:	Unstable angina  Goal:	no reoccurrence of chest pain  Instructions for follow-up, activity and diet:	follow up with primary care physician within one week after discharge  cardiac healthy diet  weight loss  smoking cessation  do NOT restart Xarelto until tomorrow 11/16 evening  do NOT restart Metformin until 11/18 due to cardiac cath IV contrast dye  Secondary Diagnosis:	Atrial fibrillation  Instructions for follow-up, activity and diet:	Atrial fibrillation is the most common heart rhythm problem & has the risk of stroke & heart attack  It helps if you control your blood pressure, not drink more than 1-2 alcohol drinks per day, cut down on caffeine, getting treatment for over active thyroid gland, & getting exercise  Call your doctor if you feel your heart racing or beating unusually, chest tightness or pain, lightheaded, faint, shortness of breath especially with exercise  It is important to take your heart medication as prescribed  You are on Xarelto for anticoagulation  Xarelto/Rivaroxaban is used to thin the blood so clots will not form and to keep existing ones from getting bigger.  Take this medication daily as prescribed by your health care provider.  Take this medication with food to prevent upset stomach.  If you miss a dose call your health care provider or pharmacist right away.  Tell your doctor you use this drug before you have a spinal or epidural procedure  Tell dentists, surgeon, and other doctors that you use this drug.  You may bleed more easily.  Be careful and avoid injury.  Use a soft toothbrush and an electric razor.  Secondary Diagnosis:	Type 2 diabetes mellitus with hyperglycemia, without long-term current use of insulin  Instructions for follow-up, activity and diet:	HgA1C this admission was 7.7%  Make sure you get your HgA1c checked every three months.  If you take oral diabetes medications, check your blood glucose two times a day.  It's important not to skip any meals.  Keep a log of your blood glucose results and always take it with you to your doctor appointments.  Keep a list of your current medications including injectables and over the counter medications and bring this medication list with you to all your doctor appointments.  If you have not seen your opthalmologist this year call for appointment.  Check your feet daily for redness, sores, or openings. Do not self treat. If no improvement in two days call your primary care physician for an appointment.  Low blood sugar (hypoglycemia) is a blood sugar below 70mg/dl. Check your blood sugar if you feel signs/symptoms of hypoglycemia. If your blood sugar is below 70 take 15 grams of carbohydrates (ex 4 oz of apple juice, 3-4 glucosr tablets, or 4-6 oz of regular soda) wait 15 minutes and repeat blood sugar to make sure it comes up above 70.  If your blood sugar is above 70 and you are due for a meal, have a meal.  If you are not due for a meal have a snack.  This snack helps keeps your blood sugar at a safe range.  Secondary Diagnosis:	Obstructive sleep apnea  Instructions for follow-up, activity and diet:	use home CPAP as directed and daily  Secondary Diagnosis:	Coronary artery disease involving native coronary artery of native heart, angina presence unspecified  Instructions for follow-up, activity and diet:	Coronary artery disease is a condition where the arteries the supply the heart muscle get clogges with fatty deposits & puts you at risk for a heart attack  Call your doctor if you have any new pain, pressure, or discomfort in the center of your chest, pain, tingling or discomfort in arms, back, neck, jaw, or stomach, shortness of breath, nausea, vomiting, burping or heartburn, sweating, cold and clammy skin, racing or abnormal heartbeat for more than 10 minutes or if they keep coming & going.  Call 911 and do not tr to get to hospital by care  You can help yourself with lefestyle changes (quitting smoking if you smoke), eat lots of fruits & vegetables & low fat dairy products, not a lot of meat & fatty foods, walk or some form of physical activity most days of the week, lose weight if you are overweight  Take your cardiac medication as prescribed to lower cholesterol, to lower blood pressure, aspirin to prevent blood clots, and diabetes control  Make sure to keep appointments with doctor for cardiac follow up care  Secondary Diagnosis:	Essential hypertension  Instructions for follow-up, activity and diet:	Follow up with your medical doctor to establish long term blood pressure treatment goals.  Secondary Diagnosis:	Gallbladder anomaly  Instructions for follow-up, activity and diet:	mild infiltrative changes around gallbladder  you will need abdominal ultrasound or HIDA scan for follow up evaluation as outpatient

## 2017-11-15 NOTE — PROGRESS NOTE ADULT - PROBLEM SELECTOR PLAN 2
-Pt is PPD+ with travel to [place] but does not have symptoms suggestive of active TB  -can check quant gold with PCP and discuss possible treatment of latent TB.

## 2017-11-15 NOTE — H&P ADULT - PROBLEM SELECTOR PLAN 2
-Pt is PPD+ with travel to [place] but does not have symptoms suggestive of active TB  -can check quant gold to eval for latent tb  -regarding opacification, ?loculated edema vs. consolidative process; obtain CT chest non con and observe off abx  -Given mild elevation of proBNP, +LE edema, +LOPEZ, and the fact that pt is afebrile, without cough and no leukocytosis, suspect fluid moreso than infectious process but will eval with above CT chest.

## 2017-11-15 NOTE — PROGRESS NOTE ADULT - PROBLEM SELECTOR PLAN 2
- rate controlled   - resume Xarelto tomorrow in setting of s/p cath today   - continue BB, CCB - rate controlled   - continue Xarelto   - continue BB, CCB  - NPO post MN  - plan for JEFFRY/ DCCV tomorrow as discussed with Dr. Colunga

## 2017-11-15 NOTE — PROGRESS NOTE ADULT - PROBLEM SELECTOR PLAN 6
Pt has existing nicotine dependence with 20+ PPY of cigarettes  Risks of tobacco/nicotine usage discussed, including cardiac disease and cancer, as well as the effect on existing comorbidities.

## 2017-11-15 NOTE — DISCHARGE NOTE ADULT - MEDICATION SUMMARY - MEDICATIONS TO TAKE
I will START or STAY ON the medications listed below when I get home from the hospital:    Aspirin Enteric Coated 81 mg oral delayed release tablet  -- 1 tab(s) by mouth once a day  -- Indication: For CArdiac stent protection    valsartan 160 mg oral tablet  -- 1 tab(s) by mouth once a day  -- Indication: For Coronary artery disease    dilTIAZem 240 mg/24 hours oral tablet, extended release  -- 1 tab(s) by mouth once a day  -- Indication: For Paroxysmal atrial fibrillation    Xarelto 20 mg oral tablet  -- 1 tab(s) by mouth once a day (in the evening)  - restart tomorrow 11/16  -- Check with your doctor before becoming pregnant.  It is very important that you take or use this exactly as directed.  Do not skip doses or discontinue unless directed by your doctor.  Obtain medical advice before taking any non-prescription drugs as some may affect the action of this medication.  Take with food.    -- Indication: For Paroxysmal atrial fibrillation anticoagulation    metFORMIN 500 mg oral tablet  -- 1 tab(s) by mouth 2 times a day - do NOT restart until 11/18 due to cardiac cath IV contrast dye  -- Indication: For diabetes    atorvastatin 80 mg oral tablet  -- 1 tab(s) by mouth once a day (at bedtime)  -- Indication: For hyperlipidemia    ticagrelor 90 mg oral tablet  -- 1 tab(s) by mouth 2 times a day  -- Indication: For Stent protection    metoprolol tartrate 100 mg oral tablet  -- 1 tab(s) by mouth 2 times a day  -- Indication: For Coronary artery disease    furosemide 20 mg oral tablet  -- 1 tab(s) by mouth once a day - start tomorrow 11/16  -- Indication: For diuretic    Synthroid 25 mcg (0.025 mg) oral tablet  -- 1 tab(s) by mouth once a day  -- Indication: For hypothyroid

## 2017-11-15 NOTE — H&P ADULT - NSHPLABSRESULTS_GEN_ALL_CORE
Labs personally reviewed : cbc nondiagnostic, cmp with mild hyperglycemia otherwise nondiagnostic. First set cardiac enzymes wnl, and proBNP 637.     Imaging personally reviewed CXR prelim read shows increased R lower thorax and left mid thorax patchy opacification.     EKG personally reviewed Afib 70s.

## 2017-11-15 NOTE — PROGRESS NOTE ADULT - SUBJECTIVE AND OBJECTIVE BOX
HPI:  62 y.o. current smoker (3-6 cigarettes/day) with PMH of HTN, T2DM, CRISS on CPAP, CAD s/p s/p LIU to PDA, pRCA, mLAD, D1 in 2011 and most recently in 2016 OM1 LIU, hypothyroidism, PAF in 2011 (lasted for 12 hrs), 2016 (pt couldn't recall how long) and recent recurrent Afib while on stress test. Pt is on CCB for rate control and Xarelto was started on 11/9/2017. Pt presents with c/o 1-2 weeks of progressive LOPEZ and neck tightness. Now, pt s/p diagnostic cardiac catheterization with non-obstructive CAD (11/15/17). Pt was requested EP consult for 8 beats of NSVT early this morning. Pt currently denies chest pain/ discomfort, dizziness, lightheadedness  or SOB.     PAST MEDICAL & SURGICAL HISTORY:  CRISS (obstructive sleep apnea)  Hyperlipidemia, unspecified hyperlipidemia type  Essential hypertension  Coronary artery disease involving native coronary artery of native heart, angina presence unspecified  Type 2 diabetes mellitus without complication, without long-term current use of insulin  Diphtheria: at 13yo  Typhoid Fever: at 21yo  Obstructive Sleep Apnea  Obesity, unspecified  Hyperlipemia  Hypertension  CAD (coronary artery disease)      ROS:    General: Pt denies recent weight loss/fever/chills    Neurological: denies numbness or  sensation loss    HEENT: denies visual changes, no hearing loss, denies sore throat    Cardiovascular: denies chest pain/palpitations/leg edema    Respiratory and Thorax: denies SOB/cough/wheezing    Gastrointestinal: denies abdominal pain/diarrhea/constipation/bloody stool    Genitourinary: denies urinary frequency/urgency/ dysuria    Musculoskeletal: denies joint pain or swelling, denies restricted motion    Skin: denies rashes/sores    Endocrine: denies heat or cold intolerance/excessive thirst    Hematologic: denies abnormal bleeding  	    MEDICATIONS  (STANDING):  aspirin  chewable 81 milliGRAM(s) Oral daily  atorvastatin 80 milliGRAM(s) Oral at bedtime  dextrose 5%. 1000 milliLiter(s) (50 mL/Hr) IV Continuous <Continuous>  dextrose 50% Injectable 12.5 Gram(s) IV Push once  dextrose 50% Injectable 25 Gram(s) IV Push once  dextrose 50% Injectable 25 Gram(s) IV Push once  diltiazem    milliGRAM(s) Oral daily  furosemide    Tablet 20 milliGRAM(s) Oral daily  heparin  Infusion.  Unit(s)/Hr (19 mL/Hr) IV Continuous <Continuous>  insulin lispro (HumaLOG) corrective regimen sliding scale   SubCutaneous three times a day before meals  insulin lispro (HumaLOG) corrective regimen sliding scale   SubCutaneous at bedtime  levothyroxine 25 MICROGram(s) Oral daily  metoprolol     tartrate 100 milliGRAM(s) Oral two times a day  ticagrelor 90 milliGRAM(s) Oral two times a day  valsartan 160 milliGRAM(s) Oral daily    MEDICATIONS  (PRN):  dextrose Gel 1 Dose(s) Oral once PRN Blood Glucose LESS THAN 70 milliGRAM(s)/deciliter  glucagon  Injectable 1 milliGRAM(s) IntraMuscular once PRN Glucose LESS THAN 70 milligrams/deciliter  heparin  Injectable 9000 Unit(s) IV Push every 6 hours PRN For aPTT less than 40  heparin  Injectable 4000 Unit(s) IV Push every 6 hours PRN For aPTT between 40 - 57      Allergies    No Known Drug Allergies  shellfish (Other)     SOCIAL HISTORY:    smoking: reduced to 3-6 cigarettes/ day, been smoking for 23 years   ETOH: denies   Caffein: 2-3 cups of coffee/ day      FAMILY HISTORY:  Family history of stroke (Mother)  Family history of hypertension (Father)  Family history of acute myocardial infarction (Father): x4      Vital Signs Last 24 Hrs  T(C): 36.9 (15 Nov 2017 16:15), Max: 36.9 (15 Nov 2017 16:15)  T(F): 98.5 (15 Nov 2017 16:15), Max: 98.5 (15 Nov 2017 16:15)  HR: 55 (15 Nov 2017 16:15) (55 - 78)  BP: 126/77 (15 Nov 2017 16:15) (124/79 - 146/79)  BP(mean): --  RR: 16 (15 Nov 2017 16:15) (16 - 18)  SpO2: 96% (15 Nov 2017 16:15) (96% - 99%)    Physical Exam:    Vital Signs : /77       HR 55    RR 16 Temp: 98.5  O2sat: 96% with RA     Constitutional: well developed, well nourished, no deformities and no acute distress    Neurological: Alert & Oriented x 3, CESAR, no focal deficits    HEENT: NC/AT, PERRLA, EOMI,  Neck supple.    Respiratory: CTA B/L, No wheezing/crackles/rhonchi    Cardiovascular: (+) S1 & S2, RRR, No m/r/g    Gastrointestinal: soft, NT, nondistended, (+) BS    Genitourinary: non distended bladder, voiding freely    Extremities: + 1 pedal edema B/L     Skin:  normal skin color and pigmentation, Rt. Radial access site intact; clean and dry, no hematoma or bleeding       LABS:                        14.7   11.1  )-----------( 180      ( 15 Nov 2017 10:17 )             44.5     11-15    137  |  101  |  11  ----------------------------<  107<H>  4.6   |  20<L>  |  0.81    Ca    8.9      15 Nov 2017 07:37  Phos  3.6     11-15  Mg     1.8     11-15    TPro  7.5  /  Alb  3.5  /  TBili  1.1  /  DBili  x   /  AST  32  /  ALT  14  /  AlkPhos  74  11-15    PT/INR - ( 14 Nov 2017 18:44 )   PT: 21.0 sec;   INR: 1.90 ratio      PTT - ( 15 Nov 2017 10:17 )  PTT:171.5 sec      RADIOLOGY & ADDITIONAL STUDIES:   US 2D TTE (11/14)  No pericardial effusion was present.  No global wall motion abnormality was identified  Mild hypokinesis of the left ventricular contractility      EF: 49% from Cath (2016) , 65% from Echo (2016)    TELE: Atrial fib with rate 60s HPI:  62 y.o. current smoker (3-6 cigarettes/day) with PMH of HTN, T2DM, CRISS on CPAP, CAD s/p s/p LIU to PDA, pRCA, mLAD, D1 in 2011 and most recently in 2016 OM1 LIU, hypothyroidism, PAF in 2011 (lasted for 12 hrs), 2016 (pt couldn't recall how long) and recent recurrent Afib while on stress test (on CCB and Xarelto started on 11/9/2017). Pt presents with c/o 1-2 weeks of progressive LOPEZ and neck tightness. Now, pt s/p diagnostic cardiac catheterization with non-obstructive CAD (11/15/17). Pt was requested EP consult for 8 beats of NSVT early this morning. Pt currently denies chest pain/ discomfort, dizziness, lightheadedness  or SOB.     PAST MEDICAL & SURGICAL HISTORY:  CRISS (obstructive sleep apnea)  Hyperlipidemia, unspecified hyperlipidemia type  Essential hypertension  Coronary artery disease involving native coronary artery of native heart, angina presence unspecified  Type 2 diabetes mellitus without complication, without long-term current use of insulin  Diphtheria: at 15yo  Typhoid Fever: at 19yo  Obstructive Sleep Apnea  Obesity, unspecified  Hyperlipemia  Hypertension  CAD (coronary artery disease)      ROS:    General: Pt denies recent weight loss/fever/chills    Neurological: denies numbness or  sensation loss    HEENT: denies visual changes, no hearing loss, denies sore throat    Cardiovascular: denies chest pain/palpitations/leg edema    Respiratory and Thorax: denies SOB/cough/wheezing    Gastrointestinal: denies abdominal pain/diarrhea/constipation/bloody stool    Genitourinary: denies urinary frequency/urgency/ dysuria    Musculoskeletal: denies joint pain or swelling, denies restricted motion    Skin: denies rashes/sores    Endocrine: denies heat or cold intolerance/excessive thirst    Hematologic: denies abnormal bleeding  	    MEDICATIONS  (STANDING):  aspirin  chewable 81 milliGRAM(s) Oral daily  atorvastatin 80 milliGRAM(s) Oral at bedtime  dextrose 5%. 1000 milliLiter(s) (50 mL/Hr) IV Continuous <Continuous>  dextrose 50% Injectable 12.5 Gram(s) IV Push once  dextrose 50% Injectable 25 Gram(s) IV Push once  dextrose 50% Injectable 25 Gram(s) IV Push once  diltiazem    milliGRAM(s) Oral daily  furosemide    Tablet 20 milliGRAM(s) Oral daily  heparin  Infusion.  Unit(s)/Hr (19 mL/Hr) IV Continuous <Continuous>  insulin lispro (HumaLOG) corrective regimen sliding scale   SubCutaneous three times a day before meals  insulin lispro (HumaLOG) corrective regimen sliding scale   SubCutaneous at bedtime  levothyroxine 25 MICROGram(s) Oral daily  metoprolol     tartrate 100 milliGRAM(s) Oral two times a day  ticagrelor 90 milliGRAM(s) Oral two times a day  valsartan 160 milliGRAM(s) Oral daily    MEDICATIONS  (PRN):  dextrose Gel 1 Dose(s) Oral once PRN Blood Glucose LESS THAN 70 milliGRAM(s)/deciliter  glucagon  Injectable 1 milliGRAM(s) IntraMuscular once PRN Glucose LESS THAN 70 milligrams/deciliter  heparin  Injectable 9000 Unit(s) IV Push every 6 hours PRN For aPTT less than 40  heparin  Injectable 4000 Unit(s) IV Push every 6 hours PRN For aPTT between 40 - 57      Allergies    No Known Drug Allergies  shellfish (Other)     SOCIAL HISTORY:    smoking: reduced to 3-6 cigarettes/ day, been smoking for 23 years   ETOH: denies   Caffein: 2-3 cups of coffee/ day      FAMILY HISTORY:  Family history of stroke (Mother)  Family history of hypertension (Father)  Family history of acute myocardial infarction (Father): x4      Vital Signs Last 24 Hrs  T(C): 36.9 (15 Nov 2017 16:15), Max: 36.9 (15 Nov 2017 16:15)  T(F): 98.5 (15 Nov 2017 16:15), Max: 98.5 (15 Nov 2017 16:15)  HR: 55 (15 Nov 2017 16:15) (55 - 78)  BP: 126/77 (15 Nov 2017 16:15) (124/79 - 146/79)  BP(mean): --  RR: 16 (15 Nov 2017 16:15) (16 - 18)  SpO2: 96% (15 Nov 2017 16:15) (96% - 99%)    Physical Exam:    Vital Signs : /77       HR 55    RR 16 Temp: 98.5  O2sat: 96% with RA     Constitutional: well developed, well nourished, no deformities and no acute distress    Neurological: Alert & Oriented x 3, CESAR, no focal deficits    HEENT: NC/AT, PERRLA, EOMI,  Neck supple.    Respiratory: CTA B/L, No wheezing/crackles/rhonchi    Cardiovascular: (+) S1 & S2, RRR, No m/r/g    Gastrointestinal: soft, NT, nondistended, (+) BS    Genitourinary: non distended bladder, voiding freely    Extremities: + 1 pedal edema B/L     Skin:  normal skin color and pigmentation, Rt. Radial access site intact; clean and dry, no hematoma or bleeding       LABS:                        14.7   11.1  )-----------( 180      ( 15 Nov 2017 10:17 )             44.5     11-15    137  |  101  |  11  ----------------------------<  107<H>  4.6   |  20<L>  |  0.81    Ca    8.9      15 Nov 2017 07:37  Phos  3.6     11-15  Mg     1.8     11-15    TPro  7.5  /  Alb  3.5  /  TBili  1.1  /  DBili  x   /  AST  32  /  ALT  14  /  AlkPhos  74  11-15    PT/INR - ( 14 Nov 2017 18:44 )   PT: 21.0 sec;   INR: 1.90 ratio      PTT - ( 15 Nov 2017 10:17 )  PTT:171.5 sec      RADIOLOGY & ADDITIONAL STUDIES:   US 2D TTE (11/14)  No pericardial effusion was present.  No global wall motion abnormality was identified  Mild hypokinesis of the left ventricular contractility      EF: 49% from Cath (2016) , 65% from Echo (2016)    TELE: Atrial fib with rate 60s HPI:  62 y.o. current smoker (3-6 cigarettes/day) with PMH of HTN, T2DM, CRISS on CPAP, CAD s/p s/p LIU to PDA, pRCA, mLAD, D1 in 2011 and most recently in 2016 OM1 LIU, hypothyroidism, PAF in 2011 (lasted for 12 hrs), 2016 (pt couldn't recall how long) and recent recurrent Afib while on stress test (on CCB and Xarelto started on 11/9/2017). Pt presents with c/o 1-2 weeks of progressive LOPEZ and neck tightness. Now, pt s/p diagnostic cardiac catheterization with non-obstructive CAD (11/15/17). Pt was requested EP consult for 8 beats of NSVT early this morning. Pt currently denies chest pain/ discomfort, dizziness, lightheadedness  or SOB.     PAST MEDICAL & SURGICAL HISTORY:  CRISS (obstructive sleep apnea)  Hyperlipidemia, unspecified hyperlipidemia type  Essential hypertension  Coronary artery disease involving native coronary artery of native heart, angina presence unspecified  Type 2 diabetes mellitus without complication, without long-term current use of insulin  Diphtheria: at 13yo  Typhoid Fever: at 19yo  Obstructive Sleep Apnea  Obesity, unspecified  Hyperlipemia  Hypertension  CAD (coronary artery disease)      ROS:    General: Pt denies recent weight loss/fever/chills    Neurological: denies numbness or  sensation loss    HEENT: denies visual changes, no hearing loss, denies sore throat    Cardiovascular: denies chest pain/palpitations/leg edema    Respiratory and Thorax: denies SOB/cough/wheezing    Gastrointestinal: denies abdominal pain/diarrhea/constipation/bloody stool    Genitourinary: denies urinary frequency/urgency/ dysuria    Musculoskeletal: denies joint pain or swelling, denies restricted motion    Skin: denies rashes/sores    Endocrine: denies heat or cold intolerance/excessive thirst    Hematologic: denies abnormal bleeding  	    MEDICATIONS  (STANDING):  aspirin  chewable 81 milliGRAM(s) Oral daily  atorvastatin 80 milliGRAM(s) Oral at bedtime  dextrose 5%. 1000 milliLiter(s) (50 mL/Hr) IV Continuous <Continuous>  dextrose 50% Injectable 12.5 Gram(s) IV Push once  dextrose 50% Injectable 25 Gram(s) IV Push once  dextrose 50% Injectable 25 Gram(s) IV Push once  diltiazem    milliGRAM(s) Oral daily  furosemide    Tablet 20 milliGRAM(s) Oral daily  heparin  Infusion.  Unit(s)/Hr (19 mL/Hr) IV Continuous <Continuous>  insulin lispro (HumaLOG) corrective regimen sliding scale   SubCutaneous three times a day before meals  insulin lispro (HumaLOG) corrective regimen sliding scale   SubCutaneous at bedtime  levothyroxine 25 MICROGram(s) Oral daily  metoprolol     tartrate 100 milliGRAM(s) Oral two times a day  ticagrelor 90 milliGRAM(s) Oral two times a day  valsartan 160 milliGRAM(s) Oral daily    MEDICATIONS  (PRN):  dextrose Gel 1 Dose(s) Oral once PRN Blood Glucose LESS THAN 70 milliGRAM(s)/deciliter  glucagon  Injectable 1 milliGRAM(s) IntraMuscular once PRN Glucose LESS THAN 70 milligrams/deciliter  heparin  Injectable 9000 Unit(s) IV Push every 6 hours PRN For aPTT less than 40  heparin  Injectable 4000 Unit(s) IV Push every 6 hours PRN For aPTT between 40 - 57      Allergies    No Known Drug Allergies  shellfish (Other)     SOCIAL HISTORY:    smoking: reduced to 3-6 cigarettes/ day, been smoking for 23 years   ETOH: denies   Caffein: 2-3 cups of coffee/ day      FAMILY HISTORY:  Family history of stroke (Mother)  Family history of hypertension (Father)  Family history of acute myocardial infarction (Father): x4      Vital Signs Last 24 Hrs  T(C): 36.9 (15 Nov 2017 16:15), Max: 36.9 (15 Nov 2017 16:15)  T(F): 98.5 (15 Nov 2017 16:15), Max: 98.5 (15 Nov 2017 16:15)  HR: 55 (15 Nov 2017 16:15) (55 - 78)  BP: 126/77 (15 Nov 2017 16:15) (124/79 - 146/79)  BP(mean): --  RR: 16 (15 Nov 2017 16:15) (16 - 18)  SpO2: 96% (15 Nov 2017 16:15) (96% - 99%)    Physical Exam:    Vital Signs : /77       HR 55    RR 16 Temp: 98.5  O2sat: 96% with RA     Constitutional: well developed, well nourished, no deformities and no acute distress    Neurological: Alert & Oriented x 3, CESAR, no focal deficits    HEENT: NC/AT, PERRLA, EOMI,  Neck supple.    Respiratory: CTA B/L, No wheezing/crackles/rhonchi    Cardiovascular: (+) S1 & S2, RRR, No m/r/g    Gastrointestinal: soft, NT, nondistended, (+) BS    Genitourinary: non distended bladder, voiding freely    Extremities: + 1 pedal edema B/L     Skin:  normal skin color and pigmentation, Rt. Radial access site intact; clean and dry, no hematoma or bleeding       LABS:                        14.7   11.1  )-----------( 180      ( 15 Nov 2017 10:17 )             44.5     11-15    137  |  101  |  11  ----------------------------<  107<H>  4.6   |  20<L>  |  0.81    Ca    8.9      15 Nov 2017 07:37  Phos  3.6     11-15  Mg     1.8     11-15    TPro  7.5  /  Alb  3.5  /  TBili  1.1  /  DBili  x   /  AST  32  /  ALT  14  /  AlkPhos  74  11-15    PT/INR - ( 14 Nov 2017 18:44 )   PT: 21.0 sec;   INR: 1.90 ratio      PTT - ( 15 Nov 2017 10:17 )  PTT:171.5 sec      RADIOLOGY & ADDITIONAL STUDIES:   US 2D TTE (11/14)  No pericardial effusion was present.  No global wall motion abnormality was identified  Mild hypokinesis of the left ventricular contractility      Cath (11/2016): EF 49%   Echo (11/2016): EF: 60-65%     TELE: Atrial fib with rate 60s

## 2017-11-15 NOTE — H&P ADULT - NSHPREVIEWOFSYSTEMS_GEN_ALL_CORE
REVIEW OF SYSTEMS:  CONSTITUTIONAL: No weakness. No fevers. No chills. No unintentional weight loss. Good appetite.  EYES: No visual changes. No eye pain.  ENT: No hearing difficulty. No vertigo. No dysphagia. No Sinusitis/rhinorrhea.  NECK: No pain. No stiffness/rigidity.  CARDIAC: No chest pain. No palpitations.  RESPIRATORY: No cough. No SOB at rest.  +amaral No hemoptysis.  GASTROINTESTINAL: No abdominal pain. No nausea. No vomiting. No hematemesis. No diarrhea. No constipation. No melena. No hematochezia.  GENITOURINARY: No dysuria. No frequency. No hesitancy. No hematuria.  NEUROLOGICAL: No numbness. No focal weakness. No incontinence. No headache.  BACK: No back pain.  EXTREMITIES: +lower extremity edema. Full ROM.  SKIN: No rashes. No itching. No other lesions.  PSYCHIATRIC: No depression. No anxiety. No SI/HI.  ALLERGIC: No lip swelling. No hives.  All other review of systems is negative unless indicated above.

## 2017-11-15 NOTE — PROGRESS NOTE ADULT - ASSESSMENT
62 y.o. current smoker with PMH of HTN, T2DM, CRISS on CPAP, CAD s/p s/p multiple PCIs in 2011 and most recently in 2016, hypothyroidism, PAF in 2011 (lasted for 12 hrs), 2016 (pt couldn't recall how long) and recent recurrent Afib while on stress test. Pt is on CCB for rate control and Xarelto (started on 11/9/2017). Pt was found with 8 beats of NSVT early this am. 62 y.o. current smoker with PMH of HTN, T2DM, CRISS on CPAP, CAD s/p s/p multiple PCIs in 2011 and most recently in 2016, hypothyroidism, PAF in 2011 (lasted for 12 hrs), 2016 (pt couldn't recall how long) and recent recurrent Afib while on stress test. Pt is on CCB for rate control and Xarelto (started on 11/9/2017). Pt was requested EP consult for 8 beats of NSVT in setting of Afib.

## 2017-11-15 NOTE — H&P ADULT - FAMILY HISTORY
Father  Still living? Unknown  Family history of acute myocardial infarction, Age at diagnosis: Age Unknown  Family history of hypertension, Age at diagnosis: Age Unknown     Mother  Still living? Unknown  Family history of stroke, Age at diagnosis: Age Unknown

## 2017-11-17 LAB
M TB TUBERC IFN-G BLD QL: 0.04 IU/ML — SIGNIFICANT CHANGE UP
M TB TUBERC IFN-G BLD QL: 6.98 IU/ML — SIGNIFICANT CHANGE UP
M TB TUBERC IFN-G BLD QL: POSITIVE
MITOGEN IGNF BCKGRD COR BLD-ACNC: >10 IU/ML — SIGNIFICANT CHANGE UP

## 2019-04-05 ENCOUNTER — INPATIENT (INPATIENT)
Facility: HOSPITAL | Age: 64
LOS: 0 days | Discharge: HOME | DRG: 310 | End: 2019-04-05
Attending: INTERNAL MEDICINE | Admitting: INTERNAL MEDICINE
Payer: COMMERCIAL

## 2019-04-05 VITALS
WEIGHT: 246.92 LBS | TEMPERATURE: 98 F | HEIGHT: 67 IN | OXYGEN SATURATION: 96 % | DIASTOLIC BLOOD PRESSURE: 76 MMHG | SYSTOLIC BLOOD PRESSURE: 177 MMHG | HEART RATE: 80 BPM | RESPIRATION RATE: 20 BRPM

## 2019-04-05 VITALS
WEIGHT: 246.92 LBS | DIASTOLIC BLOOD PRESSURE: 97 MMHG | SYSTOLIC BLOOD PRESSURE: 191 MMHG | HEIGHT: 67 IN | OXYGEN SATURATION: 97 % | HEART RATE: 75 BPM

## 2019-04-05 DIAGNOSIS — E11.9 TYPE 2 DIABETES MELLITUS WITHOUT COMPLICATIONS: ICD-10-CM

## 2019-04-05 DIAGNOSIS — I48.91 UNSPECIFIED ATRIAL FIBRILLATION: ICD-10-CM

## 2019-04-05 DIAGNOSIS — G47.33 OBSTRUCTIVE SLEEP APNEA (ADULT) (PEDIATRIC): ICD-10-CM

## 2019-04-05 DIAGNOSIS — I25.10 ATHEROSCLEROTIC HEART DISEASE OF NATIVE CORONARY ARTERY WITHOUT ANGINA PECTORIS: Chronic | ICD-10-CM

## 2019-04-05 DIAGNOSIS — E78.5 HYPERLIPIDEMIA, UNSPECIFIED: ICD-10-CM

## 2019-04-05 DIAGNOSIS — I25.10 ATHEROSCLEROTIC HEART DISEASE OF NATIVE CORONARY ARTERY WITHOUT ANGINA PECTORIS: ICD-10-CM

## 2019-04-05 DIAGNOSIS — I10 ESSENTIAL (PRIMARY) HYPERTENSION: ICD-10-CM

## 2019-04-05 LAB
ALBUMIN SERPL ELPH-MCNC: 3.9 G/DL — SIGNIFICANT CHANGE UP (ref 3.3–5)
ALP SERPL-CCNC: 68 U/L — SIGNIFICANT CHANGE UP (ref 40–120)
ALT FLD-CCNC: 20 U/L — SIGNIFICANT CHANGE UP (ref 10–45)
ANION GAP SERPL CALC-SCNC: 16 MMOL/L — SIGNIFICANT CHANGE UP (ref 5–17)
APTT BLD: 40 SEC — HIGH (ref 27.5–36.3)
AST SERPL-CCNC: 26 U/L — SIGNIFICANT CHANGE UP (ref 10–40)
BASOPHILS # BLD AUTO: 0 K/UL — SIGNIFICANT CHANGE UP (ref 0–0.2)
BASOPHILS NFR BLD AUTO: 0.3 % — SIGNIFICANT CHANGE UP (ref 0–2)
BILIRUB SERPL-MCNC: 0.7 MG/DL — SIGNIFICANT CHANGE UP (ref 0.2–1.2)
BLD GP AB SCN SERPL QL: NEGATIVE — SIGNIFICANT CHANGE UP
BUN SERPL-MCNC: 16 MG/DL — SIGNIFICANT CHANGE UP (ref 7–23)
CALCIUM SERPL-MCNC: 9.5 MG/DL — SIGNIFICANT CHANGE UP (ref 8.4–10.5)
CHLORIDE SERPL-SCNC: 102 MMOL/L — SIGNIFICANT CHANGE UP (ref 96–108)
CO2 SERPL-SCNC: 21 MMOL/L — LOW (ref 22–31)
CREAT SERPL-MCNC: 1.16 MG/DL — SIGNIFICANT CHANGE UP (ref 0.5–1.3)
EOSINOPHIL # BLD AUTO: 0.2 K/UL — SIGNIFICANT CHANGE UP (ref 0–0.5)
EOSINOPHIL NFR BLD AUTO: 1.7 % — SIGNIFICANT CHANGE UP (ref 0–6)
GLUCOSE BLDC GLUCOMTR-MCNC: 166 MG/DL — HIGH (ref 70–99)
GLUCOSE SERPL-MCNC: 170 MG/DL — HIGH (ref 70–99)
HCT VFR BLD CALC: 39.1 % — SIGNIFICANT CHANGE UP (ref 39–50)
HCV AB S/CO SERPL IA: 0.23 S/CO — SIGNIFICANT CHANGE UP (ref 0–0.99)
HCV AB SERPL-IMP: SIGNIFICANT CHANGE UP
HGB BLD-MCNC: 13.1 G/DL — SIGNIFICANT CHANGE UP (ref 13–17)
INR BLD: 3.06 RATIO — HIGH (ref 0.88–1.16)
LYMPHOCYTES # BLD AUTO: 17.3 % — SIGNIFICANT CHANGE UP (ref 13–44)
LYMPHOCYTES # BLD AUTO: 2 K/UL — SIGNIFICANT CHANGE UP (ref 1–3.3)
MCHC RBC-ENTMCNC: 30.2 PG — SIGNIFICANT CHANGE UP (ref 27–34)
MCHC RBC-ENTMCNC: 33.4 GM/DL — SIGNIFICANT CHANGE UP (ref 32–36)
MCV RBC AUTO: 90.5 FL — SIGNIFICANT CHANGE UP (ref 80–100)
MONOCYTES # BLD AUTO: 0.8 K/UL — SIGNIFICANT CHANGE UP (ref 0–0.9)
MONOCYTES NFR BLD AUTO: 7.2 % — SIGNIFICANT CHANGE UP (ref 2–14)
NEUTROPHILS # BLD AUTO: 8.7 K/UL — HIGH (ref 1.8–7.4)
NEUTROPHILS NFR BLD AUTO: 73.6 % — SIGNIFICANT CHANGE UP (ref 43–77)
PLATELET # BLD AUTO: 200 K/UL — SIGNIFICANT CHANGE UP (ref 150–400)
POTASSIUM SERPL-MCNC: 4.3 MMOL/L — SIGNIFICANT CHANGE UP (ref 3.5–5.3)
POTASSIUM SERPL-SCNC: 4.3 MMOL/L — SIGNIFICANT CHANGE UP (ref 3.5–5.3)
PROT SERPL-MCNC: 7.3 G/DL — SIGNIFICANT CHANGE UP (ref 6–8.3)
PROTHROM AB SERPL-ACNC: 36.4 SEC — HIGH (ref 10–12.9)
RBC # BLD: 4.33 M/UL — SIGNIFICANT CHANGE UP (ref 4.2–5.8)
RBC # FLD: 12.8 % — SIGNIFICANT CHANGE UP (ref 10.3–14.5)
RH IG SCN BLD-IMP: POSITIVE — SIGNIFICANT CHANGE UP
SODIUM SERPL-SCNC: 139 MMOL/L — SIGNIFICANT CHANGE UP (ref 135–145)
WBC # BLD: 11.8 K/UL — HIGH (ref 3.8–10.5)
WBC # FLD AUTO: 11.8 K/UL — HIGH (ref 3.8–10.5)

## 2019-04-05 PROCEDURE — 93306 TTE W/DOPPLER COMPLETE: CPT

## 2019-04-05 PROCEDURE — 92960 CARDIOVERSION ELECTRIC EXT: CPT

## 2019-04-05 PROCEDURE — 93010 ELECTROCARDIOGRAM REPORT: CPT | Mod: NC

## 2019-04-05 PROCEDURE — 99285 EMERGENCY DEPT VISIT HI MDM: CPT | Mod: 25

## 2019-04-05 PROCEDURE — 86900 BLOOD TYPING SEROLOGIC ABO: CPT

## 2019-04-05 PROCEDURE — 86901 BLOOD TYPING SEROLOGIC RH(D): CPT

## 2019-04-05 PROCEDURE — 93312 ECHO TRANSESOPHAGEAL: CPT

## 2019-04-05 PROCEDURE — 85027 COMPLETE CBC AUTOMATED: CPT

## 2019-04-05 PROCEDURE — 93010 ELECTROCARDIOGRAM REPORT: CPT

## 2019-04-05 PROCEDURE — 82962 GLUCOSE BLOOD TEST: CPT

## 2019-04-05 PROCEDURE — 80053 COMPREHEN METABOLIC PANEL: CPT

## 2019-04-05 PROCEDURE — 86850 RBC ANTIBODY SCREEN: CPT

## 2019-04-05 PROCEDURE — 85610 PROTHROMBIN TIME: CPT

## 2019-04-05 PROCEDURE — 93306 TTE W/DOPPLER COMPLETE: CPT | Mod: 26

## 2019-04-05 PROCEDURE — 93312 ECHO TRANSESOPHAGEAL: CPT | Mod: 26

## 2019-04-05 PROCEDURE — 93005 ELECTROCARDIOGRAM TRACING: CPT

## 2019-04-05 PROCEDURE — 99223 1ST HOSP IP/OBS HIGH 75: CPT

## 2019-04-05 PROCEDURE — 86803 HEPATITIS C AB TEST: CPT

## 2019-04-05 PROCEDURE — 85730 THROMBOPLASTIN TIME PARTIAL: CPT

## 2019-04-05 RX ORDER — LOSARTAN POTASSIUM 100 MG/1
50 TABLET, FILM COATED ORAL DAILY
Qty: 0 | Refills: 0 | Status: DISCONTINUED | OUTPATIENT
Start: 2019-04-05 | End: 2019-04-05

## 2019-04-05 RX ORDER — LEVOTHYROXINE SODIUM 125 MCG
25 TABLET ORAL DAILY
Qty: 0 | Refills: 0 | Status: DISCONTINUED | OUTPATIENT
Start: 2019-04-05 | End: 2019-04-05

## 2019-04-05 RX ORDER — RIVAROXABAN 15 MG-20MG
20 KIT ORAL EVERY 24 HOURS
Qty: 0 | Refills: 0 | Status: DISCONTINUED | OUTPATIENT
Start: 2019-04-05 | End: 2019-04-05

## 2019-04-05 RX ORDER — TICAGRELOR 90 MG/1
90 TABLET ORAL
Qty: 0 | Refills: 0 | Status: DISCONTINUED | OUTPATIENT
Start: 2019-04-05 | End: 2019-04-05

## 2019-04-05 RX ORDER — METOPROLOL TARTRATE 50 MG
100 TABLET ORAL
Qty: 0 | Refills: 0 | Status: DISCONTINUED | OUTPATIENT
Start: 2019-04-05 | End: 2019-04-05

## 2019-04-05 RX ORDER — DRONEDARONE 400 MG/1
400 TABLET, FILM COATED ORAL
Qty: 0 | Refills: 0 | Status: DISCONTINUED | OUTPATIENT
Start: 2019-04-05 | End: 2019-04-05

## 2019-04-05 RX ORDER — ATORVASTATIN CALCIUM 80 MG/1
80 TABLET, FILM COATED ORAL AT BEDTIME
Qty: 0 | Refills: 0 | Status: DISCONTINUED | OUTPATIENT
Start: 2019-04-05 | End: 2019-04-05

## 2019-04-05 RX ORDER — DILTIAZEM HCL 120 MG
240 CAPSULE, EXT RELEASE 24 HR ORAL DAILY
Qty: 0 | Refills: 0 | Status: DISCONTINUED | OUTPATIENT
Start: 2019-04-05 | End: 2019-04-05

## 2019-04-05 RX ORDER — ASPIRIN/CALCIUM CARB/MAGNESIUM 324 MG
81 TABLET ORAL DAILY
Qty: 0 | Refills: 0 | Status: DISCONTINUED | OUTPATIENT
Start: 2019-04-05 | End: 2019-04-05

## 2019-04-05 NOTE — H&P ADULT - HISTORY OF PRESENT ILLNESS
Patient is a 65 y/o/m with pmhx of atrial fibrillation, CAD , HTN, HLD admitted in the hospital 4/5 with cc of increased SOB. Apparently patient was seen by Dr. Prakash from EP saw patient yesterday in the office, noted to have increased SOB / Pulmonary edema, subsequently was referred in the hospital. Patient is planned to undergo cardiac ablation therapy for atrial fibrilaltion.  JEFFRY, diurese as needed. He has been having shortness of breath for the past three weeks. He was told to present this AM to the hospital. He currently denies CP, SOB at rest, abd pain, and fevers/chills. Patient is a 65 y/o/m with PMH of atrial fibrillation,  CAD (s/p LIU to PDA, pRCA, mLAD, D1 in 2011 and most recently in 2016 OM1 LIU), CRISS on CPAP HTN, HLD, current smoker (3-6 cigarettes/day) admitted in the hospital 4/5 with cc of increased SOB. Apparently patient was seen by Dr. Prakash from EP saw patient yesterday in the office, noted to have increased SOB / Pulmonary edema, subsequently was referred in the hospital. Patient is planned to undergo cardiac ablation therapy for atrial fibrilaltion.  JEFFRY, diurese as needed. He has been having shortness of breath for the past three weeks. He was told to present this AM to the hospital. He currently denies CP, SOB at rest, abd pain, and fevers/chills. Patient is a 63 y/o/m with PMH of atrial fibrillation (recently started on Multaq / on xarelto),  CAD (s/p LIU to PDA, pRCA, mLAD, D1 in 2011 and most recently in 2016 OM1 LIU), CRISS on CPAP HTN, HLD  admitted in the hospital 4/5 with cc of increased SOB. Apparently patient was seen by Dr. Prakash from EP yesterday in the office, noted to have increased SOB. Patient notes of exertional SOB. Denies any PND /  orthopnea. Patient recently with upper respiratory infection -  requiring amoxicillin. Given patient's symptoms of increase SOB / palpitations patient is scheduled for JEFFRY /  cardioversion. patient currently CP, SOB free. Patient notes of having outpatient CXRAY -  no fluid in the lung and ECHO -  with normal EF  as per patient.

## 2019-04-05 NOTE — ED ADULT NURSE NOTE - OBJECTIVE STATEMENT
63 YO male with PMH atrial fibrillation, CAD, HTN, HLD, via walk in presenting with complaints of shortness of breath. As per patient, for the last three weeks, pt has had worsenign shortness of breath and was instructed to come to [facility] ED in th AM for cardioversion. Pt denies  visual disturbances, numbness/tingling, fever, chills, diaphoresis, headache, nausea, vomiting, constipation, diarrhea, or urinary symptoms.   Pt Axox4, gross neuro intact, PERRL 3 mm. Lungs clear throughout bilateral. S1S2 heard, atrial fibrillation on cardiac monitor. Abdomen soft, non-tender, non-distended. Skin warm, dry, and intact. Pt placed in position of comfort. Pt educated on call bell system and provided call bell. Bed in lowest position, wheels locked, appropriate side rails raised. Pt denies needs at this time.

## 2019-04-05 NOTE — H&P ADULT - PROBLEM SELECTOR PLAN 1
- tele monitor  - EKG: Atrial fibrillation  - Poor Rhythm control with Multaq 400 mg po q12h (started 1 week ago as per pt)  - Continue with BB therapy for HR control  - keep NPO  - Anticipated JEFFRY/DCCV today  - EP following  - f/u with me and cardiologist Dr Gordon Moran on Tuesday May 7th at 2:20pm

## 2019-04-05 NOTE — ED PROVIDER NOTE - PMH
Coronary artery disease involving native coronary artery of native heart, angina presence unspecified    Diphtheria  at 15yo  Essential hypertension    Hyperlipemia    Hyperlipidemia, unspecified hyperlipidemia type    Hypertension    Obesity, unspecified    Obstructive Sleep Apnea    CRISS (obstructive sleep apnea)    Type 2 diabetes mellitus without complication, without long-term current use of insulin    Typhoid Fever  at 21yo

## 2019-04-05 NOTE — ED ADULT NURSE REASSESSMENT NOTE - NS ED NURSE REASSESS COMMENT FT1
Report received from Leeroy RN. AOx3, speaking in complete sentences. Lung sounds CTA, NAD, O2 sat 98% RA. CM in place, A fib HR 60. Pt remains NPO per order. Pt denies pain at this time. Pt awaiting bed placement at this time, pt aware of plan of care.

## 2019-04-05 NOTE — ED PROVIDER NOTE - ATTENDING CONTRIBUTION TO CARE
Afebrile. Awake and Alert. Lungs CTA. Heart irregularly irregular. Abdomen soft NTND. CN II-XII grossly intact. Moves all extremities without lateralization.    Pt referred to ED by his cardiologist for admission for scheduled cardioversion of atrial fibrillation. No acute complaints. Pre-procedure testing ordered.

## 2019-04-05 NOTE — ED ADULT NURSE NOTE - NSIMPLEMENTINTERV_GEN_ALL_ED
Implemented All Fall with Harm Risk Interventions:  [place] to call system. Call bell, personal items and telephone within reach. Instruct patient to call for assistance. Room bathroom lighting operational. Non-slip footwear when patient is off stretcher. Physically safe environment: no spills, clutter or unnecessary equipment. Stretcher in lowest position, wheels locked, appropriate side rails in place. Provide visual cue, wrist band, yellow gown, etc. Monitor gait and stability. Monitor for mental status changes and reorient to person, place, and time. Review medications for side effects contributing to fall risk. Reinforce activity limits and safety measures with patient and family. Provide visual clues: red socks.

## 2019-04-05 NOTE — CONSULT NOTE ADULT - SUBJECTIVE AND OBJECTIVE BOX
Requesting Physician : Dr. Prakash    Reason for Consultation: CAD    HISTORY OF PRESENT ILLNESS:  65 yo M with history of CAD s/p LIU to RCA/LAD/RPDA in 2011 s/p LIU to OM in 2016, HTN, HLD, AF on ac who is being seen for management of cad.  The patient was admitted with palpitations and dyspnea.  He reports these symptoms are the symptoms he gets when he is in afib.  He reports occasional chest pain but only when he pushes on his chest.  His pain is non-exertional and is not his anginal equivalent.  He does not get the pain if he doesn't push hard on his chest.  Pt. cp free upon examination.  He was evaluated by EP for symptomatic Afib and DCCV was recommended.           PAST MEDICAL & SURGICAL HISTORY:  CRISS (obstructive sleep apnea)  Hyperlipidemia, unspecified hyperlipidemia type  Essential hypertension  Coronary artery disease involving native coronary artery of native heart, angina presence unspecified  Type 2 diabetes mellitus without complication, without long-term current use of insulin  Diphtheria: at 15yo  Typhoid Fever: at 21yo  Obstructive Sleep Apnea  Obesity, unspecified  Hyperlipemia  Hypertension  CAD (coronary artery disease)          MEDICATIONS:  MEDICATIONS  (STANDING):  dronedarone 400 milliGRAM(s) Oral two times a day  levothyroxine 25 MICROGram(s) Oral daily  losartan 50 milliGRAM(s) Oral daily  metoprolol tartrate 100 milliGRAM(s) Oral two times a day  rivaroxaban 20 milliGRAM(s) Oral every 24 hours  ticagrelor 90 milliGRAM(s) Oral two times a day      Allergies    No Known Drug Allergies  shellfish (Other)    Intolerances        FAMILY HISTORY:  Family history of stroke  Family history of hypertension  Family history of acute myocardial infarction: x4    Non-contributary for premature coronary disease or sudden cardiac death    SOCIAL HISTORY:    [x ] Non-smoker  [ ] Smoker  [ ] Alcohol      REVIEW OF SYSTEMS:  [x ]chest pain  [ x ]shortness of breath  [ x ]palpitations  [  ]syncope  [ ]near syncope [ ]upper extremity weakness   [ ] lower extremity weakness  [  ]diplopia  [  ]altered mental status   [  ]fevers  [ ]chills [ ]nausea  [ ]vomitting  [  ]dysphagia    [ ]abdominal pain  [ ]melena  [ ]BRBPR    [  ]epistaxis  [  ]rash    [ ]lower extremity edema        [ ] All others negative	  [ ] Unable to obtain    PHYSICAL EXAM:  T(C): 36.7 (04-05-19 @ 07:41), Max: 36.7 (04-05-19 @ 07:41)  HR: 60 (04-05-19 @ 07:41) (60 - 80)  BP: 144/85 (04-05-19 @ 07:41) (144/85 - 177/76)  RR: 20 (04-05-19 @ 07:41) (20 - 22)  SpO2: 98% (04-05-19 @ 07:41) (96% - 98%)  Wt(kg): --  I&O's Summary        HEENT:   Normal oral mucosa, PERRL, EOMI	  Lymphatic: No lymphadenopathy , no edema  Cardiovascular: Normal S1 S2, IRRR, No murmurs , Peripheral pulses palpable 2+ bilaterally  Respiratory: Lungs clear to auscultation, normal effort 	  Gastrointestinal:  Soft, Non-tender, + BS	  Skin: No rashes, No ecchymoses, No cyanosis, warm to touch  Musculoskeletal: Normal range of motion, normal strength  Psychiatry:  Mood & affect appropriate      TELEMETRY: AF	    ECG:  < from: 12 Lead ECG (04.05.19 @ 05:49) >  ATRIAL FIBRILLATION  LEFT AXIS DEVIATION  POSSIBLE ANTERIOR INFARCT , AGE UNDETERMINED  ABNORMAL ECG    < end of copied text >  	  RADIOLOGY:  OTHER:     DIAGNOSTIC TESTING:  [ ] Echocardiogram:  [ ]  Catheterization: < from: Cardiac Cath Lab - Adult (11.15.17 @ 13:19) >  CORONARY VESSELS: The coronary circulation is right dominant.  LM:   --  LM: Angiography showed minor luminal irregularities with no flow  limiting lesions.  LAD:   --  Mid LAD: There was a 20 % stenosis.  CX:   --  OM1: There was a 20 % stenosis at the site of a prior stent.  RCA:   --  Proximal RCA: There was a 20 % stenosis at the site of a prior  stent.  COMPLICATIONS: There were no complications.  DIAGNOSTIC RECOMMENDATIONS: The patient should continue with the present  medications.  Prepared and signed by  Alexy Ballesteros M.D.    < end of copied text >    [ ] Stress Test:    	  	  LABS:	 	    CARDIAC MARKERS:                              13.1   11.8  )-----------( 200      ( 05 Apr 2019 06:48 )             39.1     04-05    139  |  102  |  16  ----------------------------<  170<H>  4.3   |  21<L>  |  1.16    Ca    9.5      05 Apr 2019 06:48    TPro  7.3  /  Alb  3.9  /  TBili  0.7  /  DBili  x   /  AST  26  /  ALT  20  /  AlkPhos  68  04-05    proBNP:   Lipid Profile:   HgA1c:   TSH:     ASSESSMENT/PLAN: 65 yo M with history of CAD s/p LIU to RCA/LAD/RPDA in 2011 s/p LIU to OM in 2016, HTN, HLD, AF on ac who is being seen for management of cad.    -pt. with no anginal symptoms   -pt. episodes of chest pain non-anginal and likely MSK  -no repeat ischemic evaluation needed at this time  -would continue with single antiplt therapy given PCI over one year ago and to avoid triple therapy  -JEFFRY-DCCV per EP  -further workup pending above      María Elena Eaton MD

## 2019-04-05 NOTE — H&P ADULT - PROBLEM SELECTOR PLAN 3
- continue with home meds  - close monitoring of BPs - continue with home meds  - continue with BB /  ARB  - close monitoring of BPs

## 2019-04-05 NOTE — H&P ADULT - PROBLEM SELECTOR PLAN 2
- continue with STATIN /  Brilinta  / ASA - continue with STATIN /  Brilinta  / BB therapy  - EKG: Atrial fibrillation, no acute ischemic changes

## 2019-04-05 NOTE — ED PROVIDER NOTE - CARE PLAN
Principal Discharge DX:	Atrial fibrillation  Assessment and plan of treatment:	Patient reported with exertional dyspnea, for afib ablation. Will call patient's EP, labs, ekg.

## 2019-04-05 NOTE — CONSULT NOTE ADULT - SUBJECTIVE AND OBJECTIVE BOX
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 More recently, his atrial fibrillation has become persistent and he has been reporting significant palpitation and dyspnea. A recent echocardiogram was normal. He was scheduled for JEFFRY/DCCV in two weeks, but called me yesterday reporting significantly worsening dyspnea upon walking less than 20 feet. Therefore I told him to come to the ER. He denies angina nor syncope    PMHX: As above, newly persistent atrial fibrillation, hypertension, coronary artery disease, diabetes, and hyperlipidemia.  PSHX: Multiple coronary artery stents, the last of which was in 2016.  Allergies: He has no known drug allergies.  Home Medications: Xarelto 20 mg daily, Brilinta 60 mg twice daily, metoprolol 100 mg twice daily, Multaq 400 bid, Diovan 160 mg daily, Lasix 20 mg daily, and Lipitor 20 mg daily.  Review of systems: No nausea, vomiting, diarrhea, fever, abdominal pain, rashes, weakness, syncope, or angina. Positive palpitations. Positive dyspnea                                13.1   11.8  )-----------( 200      ( 05 Apr 2019 06:48 )             39.1       04-05    139  |  102  |  16  ----------------------------<  170<H>  4.3   |  21<L>  |  1.16    Ca    9.5      05 Apr 2019 06:48    TPro  7.3  /  Alb  3.9  /  TBili  0.7  /  DBili  x   /  AST  26  /  ALT  20  /  AlkPhos  68  04-05        T(C): 36.7 (04-05-19 @ 07:41), Max: 36.7 (04-05-19 @ 07:41)  HR: 60 (04-05-19 @ 07:41) (60 - 80)  BP: 144/85 (04-05-19 @ 07:41) (144/85 - 177/76)  RR: 20 (04-05-19 @ 07:41) (20 - 22)  SpO2: 98% (04-05-19 @ 07:41) (96% - 98%)  Wt(kg): --    no JVD  IRR, no murmurs  CTAB  soft nt/nd  no c/c/e      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 More recently, his atrial fibrillation has become persistent and he has been reporting significant palpitation and dyspnea. A recent echocardiogram was normal. He was scheduled for JEFFRY/DCCV in two weeks, but called me yesterday reporting significantly worsening dyspnea upon walking less than 20 feet. Therefore I told him to come to the ER. He denies angina nor syncope    -keep NPO  -JEFFRY/DCCV today  -expect d/c later home today after recovery from JEFFRY/DCCV  -f/u with me and cardiologist Dr Gordon Moran on Tuesday May 7th at 2:20pm

## 2019-04-05 NOTE — H&P ADULT - NSHPLABSRESULTS_GEN_ALL_CORE
CBC Full  -  ( 05 Apr 2019 06:48 )  WBC Count : 11.8 K/uL  RBC Count : 4.33 M/uL  Hemoglobin : 13.1 g/dL  Hematocrit : 39.1 %  Platelet Count - Automated : 200 K/uL    PT/INR - ( 05 Apr 2019 06:48 )   PT: 36.4 sec;   INR: 3.06 ratio      PTT - ( 05 Apr 2019 06:48 )  PTT:40.0 sec    139  |  102  |  16  ----------------------------<  170<H>  4.3   |  21<L>  |  1.16    Ca    9.5      05 Apr 2019 06:48    TPro  7.3  /  Alb  3.9  /  TBili  0.7  /  DBili  x   /  AST  26  /  ALT  20  /  AlkPhos  68  04-05

## 2019-04-05 NOTE — H&P ADULT - NSHPREVIEWOFSYSTEMS_GEN_ALL_CORE
TC from pt.  Pt previously called regarding need for codes to see if insurance is covered for procedures recommended by .  Pt states she does not have paper work with her and would call back.  Advised pt call coding dept for further assistance.   Patient verbalized understanding and denies any questions.     REVIEW OF SYSTEMS:  General: NAD, hemodynamically stable, (-)  fever, (-) chills, (-) weakness  HEENT:  Eyes:  No visual loss, blurred vision, double vision or yellow sclerae. Ears, Nose, Throat:  No hearing loss, sneezing, congestion, runny nose or sore throat.  SKIN:  No rash or itching.  CARDIOVASCULAR:  No chest pain, chest pressure or chest discomfort. No palpitations or edema.  RESPIRATORY:  Increased SOB past few weeks  GASTROINTESTINAL:  No anorexia, nausea, vomiting or diarrhea. No abdominal pain or blood.  NEUROLOGICAL:  No headache, dizziness, syncope, paralysis, ataxia, numbness or tingling in the extremities. No change in bowel or bladder control.  MUSCULOSKELETAL:  No muscle, back pain, joint pain or stiffness.  HEMATOLOGIC:  No anemia, bleeding or bruising.  LYMPHATICS:  No enlarged nodes. No history of splenectomy.  ENDOCRINOLOGIC:  No reports of sweating, cold or heat intolerance. No polyuria or polydipsia.  ALLERGIES:  No history of asthma, hives, eczema or rhinitis.

## 2019-04-05 NOTE — H&P ADULT - NSICDXPASTMEDICALHX_GEN_ALL_CORE_FT
PAST MEDICAL HISTORY:  Coronary artery disease involving native coronary artery of native heart, angina presence unspecified     Diphtheria at 13yo    Essential hypertension     Hyperlipemia     Hyperlipidemia, unspecified hyperlipidemia type     Hypertension     Obesity, unspecified     Obstructive Sleep Apnea     CRISS (obstructive sleep apnea)     Type 2 diabetes mellitus without complication, without long-term current use of insulin     Typhoid Fever at 21yo

## 2019-04-05 NOTE — H&P ADULT - NSICDXFAMILYHX_GEN_ALL_CORE_FT
FAMILY HISTORY:  Family history of acute myocardial infarction, x4  Family history of hypertension  Family history of stroke

## 2019-04-05 NOTE — H&P ADULT - NSHPPHYSICALEXAM_GEN_ALL_CORE
Physical Exam:   GENERAL APPEARANCE:  NAD, hemodynamically stable  T(C): 36.7 (04-05-19 @ 07:41), Max: 36.7 (04-05-19 @ 07:41)  HR: 60 (04-05-19 @ 07:41) (60 - 80)  BP: 144/85 (04-05-19 @ 07:41) (144/85 - 177/76)  RR: 20 (04-05-19 @ 07:41) (20 - 22)  SpO2: 98% (04-05-19 @ 06:55) (96% - 98%)  Wt(kg): --  HEENT:  Head is normocephalic    Skin:  Warm and dry without any rash   NECK:  Supple without lymphadenopathy.   HEART:  Regular rate and rhythm. normal S1 and S2, No M/R/G  LUNGS:  Good ins/exp effort, no W/R/R/C  ABDOMEN:  Soft, nontender, nondistended with good bowel sounds heard  EXTREMITIES:  Without cyanosis, clubbing or edema.   NEUROLOGICAL:  Gross nonfocal Physical Exam:   GENERAL APPEARANCE:  NAD, hemodynamically stable  T(C): 36.7 (04-05-19 @ 07:41), Max: 36.7 (04-05-19 @ 07:41)  HR: 60 (04-05-19 @ 07:41) (60 - 80)  BP: 144/85 (04-05-19 @ 07:41) (144/85 - 177/76)  RR: 20 (04-05-19 @ 07:41) (20 - 22)  SpO2: 98% (04-05-19 @ 06:55) (96% - 98%)    HEENT:  Head is normocephalic    Skin:  Warm and dry without any rash   NECK:  Supple without lymphadenopathy.   HEART:  Regular rate and rhythm. normal S1 and S2, No M/R/G  LUNGS:  Good ins/exp effort, no W/R/R/C  ABDOMEN:  Soft, nontender, nondistended with good bowel sounds heard  EXTREMITIES:  Without cyanosis, clubbing or edema.   NEUROLOGICAL:  Gross nonfocal

## 2019-04-05 NOTE — PROGRESS NOTE ADULT - SUBJECTIVE AND OBJECTIVE BOX
EP Brief Operative Note    Diagnosis: PAF  Procedure: JEFFRY/DCCV  Surgeon: Jacquelin Prakash M.D.  Findings: none  EBL: minimal  Specimens: none  Post-op Diagnosis: NSR  Assistants: none      A/P) successful JEFFRY/DCCV, no acute complications    -continue xarelto 20mg daily and multaq 400mg bid  -d/c home today  -f/u with me at Dr Moran's office on Tuesday May 7th at 2:20pm      Jacquelin Prakash M.D., [facility]  Cardiac Electrophysiology  [facility]  [address] [place] [address], [place] [address]  [email or web address]    office [phone]  pager [phone]

## 2019-04-05 NOTE — ED PROVIDER NOTE - OBJECTIVE STATEMENT
64 year-old M with PMH afib, CAD, HTN, HLD who presented to the hospital for possible atrial fibrillation ablation. He has been having shortness of breath for the past three weeks. He was told to present this AM to the hospital. He currently denies CP, SOB at rest, abd pain, and fevers/chills. 64 year-old M with PMH afib, CAD, HTN, HLD who presented to the hospital for atrial fibrillation cardioversion. He has been having shortness of breath for the past three weeks. He was told to present this AM to the hospital. He currently denies CP, SOB at rest, abd pain, and fevers/chills.

## 2019-04-05 NOTE — H&P ADULT - PROBLEM SELECTOR PLAN 5
- Hold oral hypoglycemics  - Start HISS, check fsg qac/qhs  - Outpatient A1C  - consistent carb diet. - Hold oral hypoglycemics  - Start HISS, check fsg qac/qhs  - Outpatient A1C - 9.8 as per patient  - Patient needs a better blood sugar control. Patient is on Metformin 500 mg po BID (poorly compliant as per pt). Strongly recommended to patient to follow up with PCP for better blood sugar control as it has negative systemic effects on multiple organ systems. patient understands the recommendations. Needs podiatry /  optho follow up as an outpatient.   - consistent carb diet.

## 2019-08-21 ENCOUNTER — INPATIENT (INPATIENT)
Facility: HOSPITAL | Age: 64
LOS: 0 days | Discharge: HOME | DRG: 274 | End: 2019-08-22
Attending: INTERNAL MEDICINE | Admitting: INTERNAL MEDICINE
Payer: COMMERCIAL

## 2019-08-21 ENCOUNTER — TRANSCRIPTION ENCOUNTER (OUTPATIENT)
Age: 64
End: 2019-08-21

## 2019-08-21 VITALS
HEART RATE: 85 BPM | RESPIRATION RATE: 16 BRPM | WEIGHT: 244.93 LBS | SYSTOLIC BLOOD PRESSURE: 143 MMHG | DIASTOLIC BLOOD PRESSURE: 88 MMHG | TEMPERATURE: 98 F | OXYGEN SATURATION: 98 % | HEIGHT: 68 IN

## 2019-08-21 DIAGNOSIS — I25.10 ATHEROSCLEROTIC HEART DISEASE OF NATIVE CORONARY ARTERY WITHOUT ANGINA PECTORIS: Chronic | ICD-10-CM

## 2019-08-21 DIAGNOSIS — I48.91 UNSPECIFIED ATRIAL FIBRILLATION: ICD-10-CM

## 2019-08-21 LAB
ANION GAP SERPL CALC-SCNC: 14 MMOL/L — SIGNIFICANT CHANGE UP (ref 5–17)
APTT BLD: 39 SEC — HIGH (ref 27.5–36.3)
BLD GP AB SCN SERPL QL: NEGATIVE — SIGNIFICANT CHANGE UP
BUN SERPL-MCNC: 11 MG/DL — SIGNIFICANT CHANGE UP (ref 7–23)
CALCIUM SERPL-MCNC: 9.5 MG/DL — SIGNIFICANT CHANGE UP (ref 8.4–10.5)
CHLORIDE SERPL-SCNC: 98 MMOL/L — SIGNIFICANT CHANGE UP (ref 96–108)
CO2 SERPL-SCNC: 22 MMOL/L — SIGNIFICANT CHANGE UP (ref 22–31)
CREAT SERPL-MCNC: 0.78 MG/DL — SIGNIFICANT CHANGE UP (ref 0.5–1.3)
GLUCOSE SERPL-MCNC: 202 MG/DL — HIGH (ref 70–99)
HCT VFR BLD CALC: 42.4 % — SIGNIFICANT CHANGE UP (ref 39–50)
HGB BLD-MCNC: 13.7 G/DL — SIGNIFICANT CHANGE UP (ref 13–17)
INR BLD: 2.49 RATIO — HIGH (ref 0.88–1.16)
MAGNESIUM SERPL-MCNC: 1.6 MG/DL — SIGNIFICANT CHANGE UP (ref 1.6–2.6)
MCHC RBC-ENTMCNC: 29.1 PG — SIGNIFICANT CHANGE UP (ref 27–34)
MCHC RBC-ENTMCNC: 32.4 GM/DL — SIGNIFICANT CHANGE UP (ref 32–36)
MCV RBC AUTO: 89.9 FL — SIGNIFICANT CHANGE UP (ref 80–100)
PLATELET # BLD AUTO: 179 K/UL — SIGNIFICANT CHANGE UP (ref 150–400)
POTASSIUM SERPL-MCNC: 4.4 MMOL/L — SIGNIFICANT CHANGE UP (ref 3.5–5.3)
POTASSIUM SERPL-SCNC: 4.4 MMOL/L — SIGNIFICANT CHANGE UP (ref 3.5–5.3)
PROTHROM AB SERPL-ACNC: 29.4 SEC — HIGH (ref 10–12.9)
RBC # BLD: 4.71 M/UL — SIGNIFICANT CHANGE UP (ref 4.2–5.8)
RBC # FLD: 12.8 % — SIGNIFICANT CHANGE UP (ref 10.3–14.5)
RH IG SCN BLD-IMP: POSITIVE — SIGNIFICANT CHANGE UP
SODIUM SERPL-SCNC: 134 MMOL/L — LOW (ref 135–145)
WBC # BLD: 10.9 K/UL — HIGH (ref 3.8–10.5)
WBC # FLD AUTO: 10.9 K/UL — HIGH (ref 3.8–10.5)

## 2019-08-21 PROCEDURE — 93010 ELECTROCARDIOGRAM REPORT: CPT

## 2019-08-21 RX ORDER — AMIODARONE HYDROCHLORIDE 400 MG/1
200 TABLET ORAL DAILY
Refills: 0 | Status: CANCELLED | OUTPATIENT
Start: 2019-08-28 | End: 2019-08-22

## 2019-08-21 RX ORDER — LOSARTAN POTASSIUM 100 MG/1
1 TABLET, FILM COATED ORAL
Qty: 0 | Refills: 0 | DISCHARGE

## 2019-08-21 RX ORDER — INSULIN LISPRO 100/ML
VIAL (ML) SUBCUTANEOUS AT BEDTIME
Refills: 0 | Status: DISCONTINUED | OUTPATIENT
Start: 2019-08-21 | End: 2019-08-22

## 2019-08-21 RX ORDER — DEXTROSE 50 % IN WATER 50 %
15 SYRINGE (ML) INTRAVENOUS ONCE
Refills: 0 | Status: DISCONTINUED | OUTPATIENT
Start: 2019-08-21 | End: 2019-08-22

## 2019-08-21 RX ORDER — DEXTROSE 50 % IN WATER 50 %
25 SYRINGE (ML) INTRAVENOUS ONCE
Refills: 0 | Status: DISCONTINUED | OUTPATIENT
Start: 2019-08-21 | End: 2019-08-22

## 2019-08-21 RX ORDER — SIMVASTATIN 20 MG/1
20 TABLET, FILM COATED ORAL AT BEDTIME
Refills: 0 | Status: DISCONTINUED | OUTPATIENT
Start: 2019-08-21 | End: 2019-08-22

## 2019-08-21 RX ORDER — AMIODARONE HYDROCHLORIDE 400 MG/1
400 TABLET ORAL THREE TIMES A DAY
Refills: 0 | Status: DISCONTINUED | OUTPATIENT
Start: 2019-08-21 | End: 2019-08-22

## 2019-08-21 RX ORDER — INSULIN LISPRO 100/ML
VIAL (ML) SUBCUTANEOUS
Refills: 0 | Status: DISCONTINUED | OUTPATIENT
Start: 2019-08-21 | End: 2019-08-22

## 2019-08-21 RX ORDER — VALSARTAN 80 MG/1
1 TABLET ORAL
Qty: 0 | Refills: 0 | DISCHARGE

## 2019-08-21 RX ORDER — FUROSEMIDE 40 MG
40 TABLET ORAL
Refills: 0 | Status: COMPLETED | OUTPATIENT
Start: 2019-08-21 | End: 2019-08-22

## 2019-08-21 RX ORDER — LEVOTHYROXINE SODIUM 125 MCG
25 TABLET ORAL DAILY
Refills: 0 | Status: DISCONTINUED | OUTPATIENT
Start: 2019-08-21 | End: 2019-08-22

## 2019-08-21 RX ORDER — PANTOPRAZOLE SODIUM 20 MG/1
1 TABLET, DELAYED RELEASE ORAL
Qty: 30 | Refills: 0
Start: 2019-08-21 | End: 2019-09-19

## 2019-08-21 RX ORDER — DEXTROSE 50 % IN WATER 50 %
12.5 SYRINGE (ML) INTRAVENOUS ONCE
Refills: 0 | Status: DISCONTINUED | OUTPATIENT
Start: 2019-08-21 | End: 2019-08-22

## 2019-08-21 RX ORDER — RIVAROXABAN 15 MG-20MG
20 KIT ORAL
Refills: 0 | Status: DISCONTINUED | OUTPATIENT
Start: 2019-08-21 | End: 2019-08-22

## 2019-08-21 RX ORDER — DILTIAZEM HCL 120 MG
1 CAPSULE, EXT RELEASE 24 HR ORAL
Qty: 0 | Refills: 0 | DISCHARGE

## 2019-08-21 RX ORDER — TICAGRELOR 90 MG/1
90 TABLET ORAL
Refills: 0 | Status: DISCONTINUED | OUTPATIENT
Start: 2019-08-21 | End: 2019-08-22

## 2019-08-21 RX ORDER — LOSARTAN POTASSIUM 100 MG/1
50 TABLET, FILM COATED ORAL DAILY
Refills: 0 | Status: DISCONTINUED | OUTPATIENT
Start: 2019-08-21 | End: 2019-08-22

## 2019-08-21 RX ORDER — DILTIAZEM HCL 120 MG
120 CAPSULE, EXT RELEASE 24 HR ORAL DAILY
Refills: 0 | Status: DISCONTINUED | OUTPATIENT
Start: 2019-08-21 | End: 2019-08-21

## 2019-08-21 RX ORDER — PANTOPRAZOLE SODIUM 20 MG/1
40 TABLET, DELAYED RELEASE ORAL
Refills: 0 | Status: DISCONTINUED | OUTPATIENT
Start: 2019-08-21 | End: 2019-08-22

## 2019-08-21 RX ORDER — GLUCAGON INJECTION, SOLUTION 0.5 MG/.1ML
1 INJECTION, SOLUTION SUBCUTANEOUS ONCE
Refills: 0 | Status: DISCONTINUED | OUTPATIENT
Start: 2019-08-21 | End: 2019-08-22

## 2019-08-21 RX ORDER — SIMVASTATIN 20 MG/1
1 TABLET, FILM COATED ORAL
Qty: 0 | Refills: 0 | DISCHARGE

## 2019-08-21 RX ORDER — SODIUM CHLORIDE 9 MG/ML
1000 INJECTION, SOLUTION INTRAVENOUS
Refills: 0 | Status: DISCONTINUED | OUTPATIENT
Start: 2019-08-21 | End: 2019-08-22

## 2019-08-21 RX ORDER — ASPIRIN/CALCIUM CARB/MAGNESIUM 324 MG
1 TABLET ORAL
Qty: 0 | Refills: 0 | DISCHARGE

## 2019-08-21 RX ORDER — AMIODARONE HYDROCHLORIDE 400 MG/1
2 TABLET ORAL
Qty: 42 | Refills: 0
Start: 2019-08-21 | End: 2019-08-27

## 2019-08-21 RX ORDER — DRONEDARONE 400 MG/1
1 TABLET, FILM COATED ORAL
Qty: 0 | Refills: 0 | DISCHARGE

## 2019-08-21 RX ORDER — METOPROLOL TARTRATE 50 MG
100 TABLET ORAL
Refills: 0 | Status: DISCONTINUED | OUTPATIENT
Start: 2019-08-21 | End: 2019-08-22

## 2019-08-21 RX ADMIN — TICAGRELOR 90 MILLIGRAM(S): 90 TABLET ORAL at 17:11

## 2019-08-21 RX ADMIN — Medication 100 MILLIGRAM(S): at 17:11

## 2019-08-21 RX ADMIN — RIVAROXABAN 20 MILLIGRAM(S): KIT at 18:10

## 2019-08-21 RX ADMIN — Medication 4: at 17:11

## 2019-08-21 RX ADMIN — Medication 40 MILLIGRAM(S): at 21:07

## 2019-08-21 RX ADMIN — SIMVASTATIN 20 MILLIGRAM(S): 20 TABLET, FILM COATED ORAL at 21:07

## 2019-08-21 RX ADMIN — AMIODARONE HYDROCHLORIDE 400 MILLIGRAM(S): 400 TABLET ORAL at 21:08

## 2019-08-21 RX ADMIN — Medication 3: at 21:30

## 2019-08-21 NOTE — PRE-ANESTHESIA EVALUATION ADULT - NSANTHPMHFT_GEN_ALL_CORE
LIU to PDA/pRCA/mLAD/D1 in 2011 and OM1 in 2016  CRISS on CPAP - 13 cm H20  JEFFRY/DCCV 4/2019, sinus rhythm for 1 week then revered to AFib

## 2019-08-21 NOTE — DISCHARGE NOTE PROVIDER - HOSPITAL COURSE
HPI:    Patient is a 65 y/o male with PMH of atrial fibrillation (on Xarelto, last dose 8/20 pm), CAD (s/p LIU to PDA, pRCA, mLAD, D1 in 2011 and most recently in 2016 OM1 LIU), CRISS on CPAP, HTN, HLD, recent admission with dyspnea and palpitations on exertion with upper respiratory infection/allergy requiring antibiotics and successful JEFFRY/DCCV in April with Dr Prakash but reports SR lasted only 1 week and reports his symptoms of dyspnea is not improved.  Patient followed up with Dr Prakash and given his persistent Afib and his symptoms referred for Afib ablation for which he presents today.  Pt denies any chest discomfort, weight gain, reports slight LE swelling, PND or orthopnea.        PCP Dr Malaika Mora    Cards Dr Cameron Moran (21 Aug 2019 06:47) HPI:    Patient is a 63 y/o male with PMH of atrial fibrillation (on Xarelto, last dose 8/20 pm), CAD (s/p LIU to PDA, pRCA, mLAD, D1 in 2011 and most recently in 2016 OM1 LIU), CRISS on CPAP, HTN, HLD, recent admission with dyspnea and palpitations on exertion with upper respiratory infection/allergy requiring antibiotics and successful JEFFRY/DCCV in April with Dr Prakash but reports SR lasted only 1 week and reports his symptoms of dyspnea is not improved.  Patient followed up with Dr Prakash and given his persistent Afib and his symptoms referred for Afib ablation for which he presents today.  Pt denies any chest discomfort, weight gain, reports slight LE swelling, PND or orthopnea.    PCP Dr Malaika Mora    Cards Dr Cameron Moran (21 Aug 2019 06:47)        8/22 s/p afib ablation. B/L femoral access sites without swelling, bleeding.

## 2019-08-21 NOTE — PROGRESS NOTE ADULT - SUBJECTIVE AND OBJECTIVE BOX
Removal of Right and Left Femoral Suture    Pulses in the right and left lower extremities are palpable. The patient was placed in the supine position. The insertion site was identified and the sutures were removed per protocol.  The right groin suture then the left groin suture was then removed. Direct pressure was applied for 5 minutes to each site.    Monitoring of the right groin and both lower extremities including neuro-vascular checks and vital signs every 15 minutes x 4, then every 30 minutes x 2, then every 1 hour was ordered.    Complications: None    Comments:

## 2019-08-21 NOTE — DISCHARGE NOTE PROVIDER - CARE PROVIDER_API CALL
Cameron Moran (MD)  Cardiovascular Disease; Nuclear Cardiology  [address] [place] [address]  Phone: [phone]  Fax: [phone]  Follow Up Time:

## 2019-08-21 NOTE — H&P CARDIOLOGY - ATTENDING COMMENTS
EP ATTENDING    Patient seen and examined. Agree with above. See my office note for detail discussions about the R/B/A of AF ablation. In addition given negative JEFFRY in April 2019, and the fact that he has been on uninterrupted xarelto since, would assess that risks of repeat JEFFRY before today's ablation outweigh its benefits. Patient completely understands the rationale of not repeating the JEFFRY, and assures me he hasn't missed any doses of xarelto. This is further confirmed by his elevated INR. Would recommend proceeding to ablation without the need to repeat JEFFRY.

## 2019-08-21 NOTE — PROGRESS NOTE ADULT - SUBJECTIVE AND OBJECTIVE BOX
EP Brief Operative Note    Diagnosis: persistent AF  Procedure: persistent AF ablation (PVI and CTI)  Surgeon: Jacquelin Prakash M.D.  Findings: none  EBL: minimal  Specimens: none  Post-op Diagnosis: NSR  Assistants: none      A/P) s/p persistent AF ablation (CTI and PVI), no acute complications    -resume all home meds including xarelto  -no more cardizem, use amiodarone for 3 months (400mg tid x 7 days followed by 200mg daily)  -use protonix 40mg daily for 1 month  -expect d/c home tomorrow  -f/u with me at Dr Moran's office on Tuesday September 17th at 2pm      Jacquelin Prakash M.D., [facility]  Cardiac Electrophysiology  [facility]  [address] [place] [address]  [email or web address]    office [phone]  pager [phone]

## 2019-08-21 NOTE — H&P CARDIOLOGY - FAMILY HISTORY
Family history of hypertension     Father  Still living? No  Family history of acute myocardial infarction, Age at diagnosis: Age Unknown     Mother  Still living? Yes, Estimated age: Age Unknown  Family history of stroke, Age at diagnosis: Age Unknown

## 2019-08-21 NOTE — DISCHARGE NOTE PROVIDER - NSDCCPTREATMENT_GEN_ALL_CORE_FT
PRINCIPAL PROCEDURE  Procedure: Atrial ablation  Findings and Treatment: No heavy lifting for 2 weeks, no strenuous activity  or uneccesary stair climbing, no driving for x 2 days,  you may shower 24 hours following procedure but no bathing or swimming for x1  week, no bending, no straining while having a Bowel movement, No strenuous sexual activity for x 1 week check groin for bleeding, pain, tightness or ( golf ball size)  swelling daily following procedure , & follow up with your cardiologist in 1-2 week

## 2019-08-21 NOTE — H&P CARDIOLOGY - HISTORY OF PRESENT ILLNESS
Patient is a 63 y/o male with PMH of atrial fibrillation (on Multaq and Xarelto), CAD (s/p LIU to PDA, pRCA, mLAD, D1 in 2011 and most recently in 2016 OM1 LIU), CRISS on CPAP HTN, HLD, recent admission with dyspnea and palpitations on exertion with upper respiratory infection requiring antibiotics, s/p successful JEFFRY/DCCV.  Denies any PND /  orthopnea. Patient recently with upper respiratory infection -  requiring amoxicillin. Given patient's symptoms of increase SOB / palpitations patient is scheduled for in April patient currently CP, SOB free. Patient notes of having outpatient CXRAY -  no fluid in the lung and ECHO -  with normal EF  as per patient. Patient is a 63 y/o male with PMH of atrial fibrillation (on Xarelto, last dose 8/20 pm), CAD (s/p LIU to PDA, pRCA, mLAD, D1 in 2011 and most recently in 2016 OM1 LIU), CRISS on CPAP, HTN, HLD, recent admission with dyspnea and palpitations on exertion with upper respiratory infection/allergy requiring antibiotics and successful JEFFRY/DCCV in April with Dr Prakash but reports SR lasted only 1 week and reports his symptoms of dyspnea is not improved.  Patient followed up with Dr Prakash and given his persistent Afib and his symptoms referred for Afib ablation for which he presents today.  Pt denies any chest discomfort, weight gain, reports slight LE swelling, PND or orthopnea.    PCP Dr Malaika Mora  Cards Dr Cameron Moran

## 2019-08-21 NOTE — H&P CARDIOLOGY - CARDIOVASCULAR
negative Regular rate & rhythm, normal S1, S2; no murmurs, gallops or rubs; no S3, S4 shooting/sharp

## 2019-08-21 NOTE — DISCHARGE NOTE PROVIDER - NSDCCPCAREPLAN_GEN_ALL_CORE_FT
PRINCIPAL DISCHARGE DIAGNOSIS  Diagnosis: Afib  Assessment and Plan of Treatment: Your heart rate and rhythm will be controlled.  Continue with your cardiologist and primary care MD. Continue your current medications. Call your physician for palpitations, feelings of rapid heart beat, lightheadedness, or dizziness.   continue Xrelto

## 2019-08-22 ENCOUNTER — TRANSCRIPTION ENCOUNTER (OUTPATIENT)
Age: 64
End: 2019-08-22

## 2019-08-22 VITALS
DIASTOLIC BLOOD PRESSURE: 98 MMHG | RESPIRATION RATE: 18 BRPM | OXYGEN SATURATION: 97 % | SYSTOLIC BLOOD PRESSURE: 150 MMHG | HEART RATE: 73 BPM

## 2019-08-22 LAB
ANION GAP SERPL CALC-SCNC: 12 MMOL/L — SIGNIFICANT CHANGE UP (ref 5–17)
BUN SERPL-MCNC: 21 MG/DL — SIGNIFICANT CHANGE UP (ref 7–23)
CALCIUM SERPL-MCNC: 8.8 MG/DL — SIGNIFICANT CHANGE UP (ref 8.4–10.5)
CHLORIDE SERPL-SCNC: 94 MMOL/L — LOW (ref 96–108)
CO2 SERPL-SCNC: 25 MMOL/L — SIGNIFICANT CHANGE UP (ref 22–31)
CREAT SERPL-MCNC: 1.02 MG/DL — SIGNIFICANT CHANGE UP (ref 0.5–1.3)
GLUCOSE SERPL-MCNC: 300 MG/DL — HIGH (ref 70–99)
HCT VFR BLD CALC: 38.5 % — LOW (ref 39–50)
HGB BLD-MCNC: 12.6 G/DL — LOW (ref 13–17)
MCHC RBC-ENTMCNC: 30.1 PG — SIGNIFICANT CHANGE UP (ref 27–34)
MCHC RBC-ENTMCNC: 32.7 GM/DL — SIGNIFICANT CHANGE UP (ref 32–36)
MCV RBC AUTO: 91.8 FL — SIGNIFICANT CHANGE UP (ref 80–100)
PLATELET # BLD AUTO: 164 K/UL — SIGNIFICANT CHANGE UP (ref 150–400)
POTASSIUM SERPL-MCNC: 3.7 MMOL/L — SIGNIFICANT CHANGE UP (ref 3.5–5.3)
POTASSIUM SERPL-SCNC: 3.7 MMOL/L — SIGNIFICANT CHANGE UP (ref 3.5–5.3)
RBC # BLD: 4.19 M/UL — LOW (ref 4.2–5.8)
RBC # FLD: 12.6 % — SIGNIFICANT CHANGE UP (ref 10.3–14.5)
SODIUM SERPL-SCNC: 131 MMOL/L — LOW (ref 135–145)
WBC # BLD: 17.7 K/UL — HIGH (ref 3.8–10.5)
WBC # FLD AUTO: 17.7 K/UL — HIGH (ref 3.8–10.5)

## 2019-08-22 PROCEDURE — C1766: CPT

## 2019-08-22 PROCEDURE — 93656 COMPRE EP EVAL ABLTJ ATR FIB: CPT

## 2019-08-22 PROCEDURE — 85730 THROMBOPLASTIN TIME PARTIAL: CPT

## 2019-08-22 PROCEDURE — 93655 ICAR CATH ABLTJ DSCRT ARRHYT: CPT

## 2019-08-22 PROCEDURE — 86850 RBC ANTIBODY SCREEN: CPT

## 2019-08-22 PROCEDURE — 93613 INTRACARDIAC EPHYS 3D MAPG: CPT

## 2019-08-22 PROCEDURE — C1731: CPT

## 2019-08-22 PROCEDURE — 93005 ELECTROCARDIOGRAM TRACING: CPT

## 2019-08-22 PROCEDURE — 82962 GLUCOSE BLOOD TEST: CPT

## 2019-08-22 PROCEDURE — 86900 BLOOD TYPING SEROLOGIC ABO: CPT

## 2019-08-22 PROCEDURE — 94660 CPAP INITIATION&MGMT: CPT

## 2019-08-22 PROCEDURE — C1894: CPT

## 2019-08-22 PROCEDURE — 99238 HOSP IP/OBS DSCHRG MGMT 30/<: CPT

## 2019-08-22 PROCEDURE — 85027 COMPLETE CBC AUTOMATED: CPT

## 2019-08-22 PROCEDURE — C1759: CPT

## 2019-08-22 PROCEDURE — 93662 INTRACARDIAC ECG (ICE): CPT

## 2019-08-22 PROCEDURE — 85610 PROTHROMBIN TIME: CPT

## 2019-08-22 PROCEDURE — 93623 PRGRMD STIMJ&PACG IV RX NFS: CPT

## 2019-08-22 PROCEDURE — C1732: CPT

## 2019-08-22 PROCEDURE — 86901 BLOOD TYPING SEROLOGIC RH(D): CPT

## 2019-08-22 PROCEDURE — 80048 BASIC METABOLIC PNL TOTAL CA: CPT

## 2019-08-22 PROCEDURE — 83735 ASSAY OF MAGNESIUM: CPT

## 2019-08-22 PROCEDURE — 93657 TX L/R ATRIAL FIB ADDL: CPT

## 2019-08-22 RX ADMIN — Medication 100 MILLIGRAM(S): at 05:14

## 2019-08-22 RX ADMIN — Medication 25 MICROGRAM(S): at 05:14

## 2019-08-22 RX ADMIN — Medication 6: at 07:27

## 2019-08-22 RX ADMIN — AMIODARONE HYDROCHLORIDE 400 MILLIGRAM(S): 400 TABLET ORAL at 05:14

## 2019-08-22 RX ADMIN — Medication 30 MILLILITER(S): at 00:06

## 2019-08-22 RX ADMIN — LOSARTAN POTASSIUM 50 MILLIGRAM(S): 100 TABLET, FILM COATED ORAL at 05:14

## 2019-08-22 RX ADMIN — Medication 40 MILLIGRAM(S): at 05:16

## 2019-08-22 RX ADMIN — PANTOPRAZOLE SODIUM 40 MILLIGRAM(S): 20 TABLET, DELAYED RELEASE ORAL at 05:14

## 2019-08-22 RX ADMIN — TICAGRELOR 90 MILLIGRAM(S): 90 TABLET ORAL at 05:14

## 2019-08-22 NOTE — DISCHARGE NOTE NURSING/CASE MANAGEMENT/SOCIAL WORK - NSDCDPATPORTLINK_GEN_ALL_CORE
You can access the [facility] Patient Portal, offered by [facility], by registering with the following website: [email or web address]

## 2019-08-29 RX ORDER — AMIODARONE HYDROCHLORIDE 400 MG/1
1 TABLET ORAL
Qty: 30 | Refills: 3
Start: 2019-08-29 | End: 2019-12-26

## 2019-09-23 NOTE — ED ADULT NURSE NOTE - CCCP TRG CHIEF CMPLNT
Chief Complaint:   Chief Complaint   Patient presents with   • Follow-up       HPI:    Romel Wilson is a 71 y.o. female here for follow-up of ***        Current medications are:   Current Outpatient Medications:   •  bumetanide (BUMEX) 1 MG tablet, , Disp: , Rfl:   •  fluticasone (FLONASE) 50 MCG/ACT nasal spray, , Disp: , Rfl:   •  furosemide (LASIX) 40 MG tablet, Take 40 mg by mouth 2 (Two) Times a Day., Disp: , Rfl:   •  levothyroxine (SYNTHROID, LEVOTHROID) 125 MCG tablet, , Disp: , Rfl:   •  pravastatin (PRAVACHOL) 40 MG tablet, Take 40 mg by mouth Daily., Disp: , Rfl:   •  vitamin B-12 (CYANOCOBALAMIN) 100 MCG tablet, , Disp: , Rfl: .      The patient's relevant past medical, surgical, family and social history were reviewed and updated in Epic as appropriate.       Review of Systems      Objective:    Physical Exam      ASSESSMENT/PLAN    Romel was seen today for follow-up.    Diagnoses and all orders for this visit:    Obstructive sleep apnea, adult  -     CPAP Therapy            1. Counseled patient regarding multimodal approach with healthy nutrition, healthy sleep, regular physical activity, social activities, counseling, and medications. Encouraged to practice sleep position. Avoid alcohol and sedatives close to bedtime.  2.     I have reviewed the results of my evaluation and impression and discussed my recommendations in detail with the patient.      Signed by  Ethel Denise, APRN    September 23, 2019      CC: Lena Bush MD          No ref. provider found       shortness of breath

## 2020-01-09 NOTE — H&P ADULT - PROBLEM/PLAN-5
Encounter Summary
  Created on: 2020
 
 SantosVeda
 External Reference #: [id number]
 : 45
 Sex: Female
 
 Demographics
 
 
+-----------------------+----------------------+
| Address               | [address]      |
|                       | [place]  [address] |
+-----------------------+----------------------+
| Home Phone            | [phone]      |
+-----------------------+----------------------+
| Preferred Language    | Unknown              |
+-----------------------+----------------------+
| Marital Status        |               |
+-----------------------+----------------------+
| [employer] Affiliation | [id number]                 |
+-----------------------+----------------------+
| Race                  | Unknown              |
+-----------------------+----------------------+
| Ethnic Group          | Unknown              |
+-----------------------+----------------------+
 
 
 Author
 
 
+--------------+--------------------------------------------+
| Author       | [facility]  |
|              | and [facility]                                |
+--------------+--------------------------------------------+
| Organization | [facility]  |
|              | and [facility]                                |
+--------------+--------------------------------------------+
| Address      | Unknown                                    |
+--------------+--------------------------------------------+
| Phone        | Unavailable                                |
+--------------+--------------------------------------------+
 
 
 
 Support
 
 
+----------------+--------------+-----------------+-----------------+
| Name           | Relationship | Address         | Phone           |
+----------------+--------------+-----------------+-----------------+
| Johan Santos | [place]         | [address] | [phone] |
|                |              | [place]  [address] |                 |
+----------------+--------------+-----------------+-----------------+
 
 
 
 
 Care Team Providers
 
 
+-----------------------+------+-------------+
| Care Team Member Name | Role | Phone       |
+-----------------------+------+-------------+
 PCP  | Unavailable |
+-----------------------+------+-------------+
 
 
 
 Reason for Visit
 
 
+---------+----------+
| Reason  | Comments |
+---------+----------+
| Results |          |
+---------+----------+
 
 
 
 Encounter Details
 
 
+--------+-----------+---------------------+----------------------+-------------+
| Date   | Type      | Department          | Care Team            | Description |
+--------+-----------+---------------------+----------------------+-------------+
| / | Telephone |   [facility]         |   Marc,       | Results     |
| 2015   |           | NEPHROLOGY  [address]   | [address] |             |
|        |           | [address]   |  [address] [place]    |             |
|        |           | [place], [place]     | [address]  [place], [place] |             |
|        |           | [address]          |  [address]  [phone] |             |
|        |           | [phone]        |   [phone] (Fax) |             |
+--------+-----------+---------------------+----------------------+-------------+
 
 
 
 Social History
 
 
+---------------+------------+-----------+--------+------------------+
| Tobacco Use   | Types      | Packs/Day | Years  | Date             |
|               |            |           | Used   |                  |
+---------------+------------+-----------+--------+------------------+
| Former Smoker | Cigarettes | 0.25      | 5      | Quit: 1968 |
+---------------+------------+-----------+--------+------------------+
 
 
 
+---------------------+---+---+---+
| Smokeless Tobacco:  |   |   |   |
| Never Used          |   |   |   |
+---------------------+---+---+---+
 
 
 
+-------------+-------------+---------+--------------+
| Alcohol Use | Drinks/Week | oz/Week | Comments     |
+-------------+-------------+---------+--------------+
 
| No          |             |         | Twice a year |
+-------------+-------------+---------+--------------+
 
 
 
+------------------+---------------+
| Sex Assigned at  | Date Recorded |
| Birth            |               |
+------------------+---------------+
| Not on file      |               |
+------------------+---------------+
 
 
 
+----------------+-------------+-------------+
| Job Start Date | Occupation  | Industry    |
+----------------+-------------+-------------+
| Not on file    | Not on file | Not on file |
+----------------+-------------+-------------+
 
 
 
+----------------+--------------+------------+
| Travel History | Travel Start | Travel End |
+----------------+--------------+------------+
 
 
 
+-------------------------------------+
| No recent travel history available. |
+-------------------------------------+
 documented as of this encounter
 
 Plan of Treatment
 
 
+--------+---------+------------+----------------------+-------------+
| Date   | Type    | Specialty  | Care Team            | Description |
+--------+---------+------------+----------------------+-------------+
| / | Office  | Nephrology |   Dante Escudero MD  |             |
|    | Visit   |            |  [address]  [place]  |             |
|        |         |            | [address]  [place], [place]   |             |
|        |         |            | [address]  [phone]  |             |
|        |         |            |  [phone] (Fax)  |             |
+--------+---------+------------+----------------------+-------------+
 documented as of this encounter
 
 Visit Diagnoses
 Not on filedocumented in this encounter DISPLAY PLAN FREE TEXT

## 2022-12-30 ENCOUNTER — OUTPATIENT (OUTPATIENT)
Dept: OUTPATIENT SERVICES | Facility: HOSPITAL | Age: 67
LOS: 1 days | End: 2022-12-30
Payer: MEDICARE

## 2022-12-30 ENCOUNTER — NON-APPOINTMENT (OUTPATIENT)
Age: 67
End: 2022-12-30

## 2022-12-30 DIAGNOSIS — Z11.52 ENCOUNTER FOR SCREENING FOR COVID-19: ICD-10-CM

## 2022-12-30 DIAGNOSIS — I25.10 ATHEROSCLEROTIC HEART DISEASE OF NATIVE CORONARY ARTERY WITHOUT ANGINA PECTORIS: Chronic | ICD-10-CM

## 2022-12-30 LAB — SARS-COV-2 RNA SPEC QL NAA+PROBE: SIGNIFICANT CHANGE UP

## 2022-12-30 PROCEDURE — C9803: CPT

## 2022-12-30 PROCEDURE — U0005: CPT

## 2022-12-30 PROCEDURE — U0003: CPT

## 2023-01-03 ENCOUNTER — TRANSCRIPTION ENCOUNTER (OUTPATIENT)
Age: 68
End: 2023-01-03

## 2023-01-03 ENCOUNTER — OUTPATIENT (OUTPATIENT)
Dept: OUTPATIENT SERVICES | Facility: HOSPITAL | Age: 68
LOS: 1 days | End: 2023-01-03
Payer: MEDICARE

## 2023-01-03 VITALS
DIASTOLIC BLOOD PRESSURE: 76 MMHG | HEART RATE: 56 BPM | SYSTOLIC BLOOD PRESSURE: 158 MMHG | OXYGEN SATURATION: 98 % | TEMPERATURE: 98 F | RESPIRATION RATE: 16 BRPM | HEIGHT: 68 IN | WEIGHT: 222.01 LBS

## 2023-01-03 VITALS
RESPIRATION RATE: 14 BRPM | OXYGEN SATURATION: 99 % | DIASTOLIC BLOOD PRESSURE: 75 MMHG | SYSTOLIC BLOOD PRESSURE: 164 MMHG | HEART RATE: 57 BPM

## 2023-01-03 DIAGNOSIS — R94.39 ABNORMAL RESULT OF OTHER CARDIOVASCULAR FUNCTION STUDY: ICD-10-CM

## 2023-01-03 DIAGNOSIS — I25.10 ATHEROSCLEROTIC HEART DISEASE OF NATIVE CORONARY ARTERY WITHOUT ANGINA PECTORIS: Chronic | ICD-10-CM

## 2023-01-03 LAB
ANION GAP SERPL CALC-SCNC: 11 MMOL/L — SIGNIFICANT CHANGE UP (ref 5–17)
BUN SERPL-MCNC: 14 MG/DL — SIGNIFICANT CHANGE UP (ref 7–23)
CALCIUM SERPL-MCNC: 9.2 MG/DL — SIGNIFICANT CHANGE UP (ref 8.4–10.5)
CHLORIDE SERPL-SCNC: 100 MMOL/L — SIGNIFICANT CHANGE UP (ref 96–108)
CO2 SERPL-SCNC: 22 MMOL/L — SIGNIFICANT CHANGE UP (ref 22–31)
CREAT SERPL-MCNC: 0.87 MG/DL — SIGNIFICANT CHANGE UP (ref 0.5–1.3)
EGFR: 96 ML/MIN/1.73M2 — SIGNIFICANT CHANGE UP
GLUCOSE BLDC GLUCOMTR-MCNC: 148 MG/DL — HIGH (ref 70–99)
GLUCOSE BLDC GLUCOMTR-MCNC: 187 MG/DL — HIGH (ref 70–99)
GLUCOSE SERPL-MCNC: 173 MG/DL — HIGH (ref 70–99)
HCT VFR BLD CALC: 42.9 % — SIGNIFICANT CHANGE UP (ref 39–50)
HGB BLD-MCNC: 13.9 G/DL — SIGNIFICANT CHANGE UP (ref 13–17)
MCHC RBC-ENTMCNC: 29.3 PG — SIGNIFICANT CHANGE UP (ref 27–34)
MCHC RBC-ENTMCNC: 32.4 GM/DL — SIGNIFICANT CHANGE UP (ref 32–36)
MCV RBC AUTO: 90.5 FL — SIGNIFICANT CHANGE UP (ref 80–100)
NRBC # BLD: 0 /100 WBCS — SIGNIFICANT CHANGE UP (ref 0–0)
PLATELET # BLD AUTO: 176 K/UL — SIGNIFICANT CHANGE UP (ref 150–400)
POTASSIUM SERPL-MCNC: 3.9 MMOL/L — SIGNIFICANT CHANGE UP (ref 3.5–5.3)
POTASSIUM SERPL-MCNC: 5.4 MMOL/L — HIGH (ref 3.5–5.3)
POTASSIUM SERPL-SCNC: 3.9 MMOL/L — SIGNIFICANT CHANGE UP (ref 3.5–5.3)
POTASSIUM SERPL-SCNC: 5.4 MMOL/L — HIGH (ref 3.5–5.3)
RBC # BLD: 4.74 M/UL — SIGNIFICANT CHANGE UP (ref 4.2–5.8)
RBC # FLD: 12.8 % — SIGNIFICANT CHANGE UP (ref 10.3–14.5)
SODIUM SERPL-SCNC: 133 MMOL/L — LOW (ref 135–145)
WBC # BLD: 8.71 K/UL — SIGNIFICANT CHANGE UP (ref 3.8–10.5)
WBC # FLD AUTO: 8.71 K/UL — SIGNIFICANT CHANGE UP (ref 3.8–10.5)

## 2023-01-03 PROCEDURE — C9600: CPT | Mod: LD

## 2023-01-03 PROCEDURE — 36415 COLL VENOUS BLD VENIPUNCTURE: CPT

## 2023-01-03 PROCEDURE — 82962 GLUCOSE BLOOD TEST: CPT

## 2023-01-03 PROCEDURE — C1894: CPT

## 2023-01-03 PROCEDURE — 85027 COMPLETE CBC AUTOMATED: CPT

## 2023-01-03 PROCEDURE — 92978 ENDOLUMINL IVUS OCT C 1ST: CPT | Mod: LD

## 2023-01-03 PROCEDURE — 92928 PRQ TCAT PLMT NTRAC ST 1 LES: CPT | Mod: LD

## 2023-01-03 PROCEDURE — 93005 ELECTROCARDIOGRAM TRACING: CPT

## 2023-01-03 PROCEDURE — C1753: CPT

## 2023-01-03 PROCEDURE — 99152 MOD SED SAME PHYS/QHP 5/>YRS: CPT

## 2023-01-03 PROCEDURE — C1887: CPT

## 2023-01-03 PROCEDURE — 84132 ASSAY OF SERUM POTASSIUM: CPT

## 2023-01-03 PROCEDURE — 93010 ELECTROCARDIOGRAM REPORT: CPT

## 2023-01-03 PROCEDURE — 93454 CORONARY ARTERY ANGIO S&I: CPT | Mod: 26,59

## 2023-01-03 PROCEDURE — 92978 ENDOLUMINL IVUS OCT C 1ST: CPT | Mod: 26,LD

## 2023-01-03 PROCEDURE — C1874: CPT

## 2023-01-03 PROCEDURE — 80048 BASIC METABOLIC PNL TOTAL CA: CPT

## 2023-01-03 PROCEDURE — 93454 CORONARY ARTERY ANGIO S&I: CPT | Mod: 59

## 2023-01-03 PROCEDURE — C1725: CPT

## 2023-01-03 PROCEDURE — C1769: CPT

## 2023-01-03 RX ORDER — SIMVASTATIN 20 MG/1
1 TABLET, FILM COATED ORAL
Qty: 0 | Refills: 0 | DISCHARGE

## 2023-01-03 RX ORDER — CLOPIDOGREL BISULFATE 75 MG/1
1 TABLET, FILM COATED ORAL
Qty: 90 | Refills: 3
Start: 2023-01-03 | End: 2023-12-28

## 2023-01-03 RX ORDER — METFORMIN HYDROCHLORIDE 850 MG/1
1 TABLET ORAL
Qty: 0 | Refills: 0 | DISCHARGE

## 2023-01-03 RX ORDER — AMLODIPINE BESYLATE 2.5 MG/1
1 TABLET ORAL
Qty: 0 | Refills: 0 | DISCHARGE

## 2023-01-03 RX ORDER — LEVOTHYROXINE SODIUM 125 MCG
1 TABLET ORAL
Qty: 0 | Refills: 0 | DISCHARGE

## 2023-01-03 RX ORDER — METOPROLOL TARTRATE 50 MG
1 TABLET ORAL
Qty: 0 | Refills: 0 | DISCHARGE

## 2023-01-03 RX ORDER — ASPIRIN/CALCIUM CARB/MAGNESIUM 324 MG
1 TABLET ORAL
Qty: 90 | Refills: 3
Start: 2023-01-03 | End: 2023-12-28

## 2023-01-03 RX ORDER — RIVAROXABAN 15 MG-20MG
1 KIT ORAL
Qty: 30 | Refills: 0
Start: 2023-01-03 | End: 2023-02-01

## 2023-01-03 RX ORDER — VALSARTAN 80 MG/1
1 TABLET ORAL
Qty: 0 | Refills: 0 | DISCHARGE

## 2023-01-03 RX ORDER — CANDESARTAN CILEXETIL 8 MG/1
1 TABLET ORAL
Qty: 0 | Refills: 0 | DISCHARGE

## 2023-01-03 NOTE — ASU PATIENT PROFILE, ADULT - FALL HARM RISK - UNIVERSAL INTERVENTIONS
Bed in lowest position, wheels locked, appropriate side rails in place/Call bell, personal items and telephone in reach/Instruct patient to call for assistance before getting out of bed or chair/Non-slip footwear when patient is out of bed/[place] to call system/Physically safe environment - no spills, clutter or unnecessary equipment/Purposeful Proactive Rounding/Room/bathroom lighting operational, light cord in reach

## 2023-01-03 NOTE — H&P CARDIOLOGY - NSICDXFAMILYHX_GEN_ALL_CORE_FT
FAMILY HISTORY:  Family history of hypertension    Father  Still living? No  Family history of acute myocardial infarction, Age at diagnosis: Age Unknown    Mother  Still living? Yes, Estimated age: Age Unknown  Family history of stroke, Age at diagnosis: Age Unknown

## 2023-01-03 NOTE — ASU DISCHARGE PLAN (ADULT/PEDIATRIC) - ASU DC SPECIAL INSTRUCTIONSFT
Wound Care:   the day AFTER your procedure remove bandage GENTLY, and clean using  mild soap and gentle warm, water stream, pat dry. leave OPEN to air. YOU MAY SHOWER   DO NOT apply lotions, creams, ointments, powder, perfumes to your incision site  DO NOT SOAK your site for 1 week (no baths, no pools, no tubs, etc...)  Check  your groin and /or wrist daily. A small amount of bruising, and soreness are normal    ACTIVITY: for 24 hours   - DO NOT DRIVE  - DO NOT make any important decisions or sign legal documents   - DO NOT operate heavy machineries  - you may resume sexual activity in 48 hours, unless otherwise instructed by your cardiologist     If your procedure was done through the WRIST: for the NEXT 3 DAYS:  - avoid pushing, pulling, with that affected wrist   - avoid repeated movement of that hand and wrist (eg: typing, hammering)  - DO NOT LIFT anything more than 5 lbs     If your procedure was done through the GROIN: for the NEXT 5 DAYS  - Limit climbing stairs, DO NOT soak in bathtub or pool  - no strenuous activities, pushing, pulling, straining  - Do not lift anything 10lbs or heavier     MEDICATION:   take your medications as explained (see discharge paperwork)   If you received a STENT, you will be taking antiplatelet medications to KEEP YOUR STENT OPEN ( eg: Aspirin, Plavix, Brilinta, Effient, etc).  Take as prescribed DO NOT STOP taking them without consulting with your cardiologist first.     Follow heart healthy diet recommended by your doctor, , if you smoke STOP SMOKING ( may call [phone] for center of tobacco control if you need assistance)     CALL your doctor to make appointment in 2 WEEKS     ***CALL YOUR DOCTOR***  if you experience: fever, chills, body aches, or severe pain, swelling, redness, heat or yellow discharge at incision site  If you experience Bleeding or excruciating pain at the procedural site, swelling ( golf ball size) at your procedural site  If you experience CHEST PAIN  If you experience extremity numbness, tingling, temperature change ( of your procedural site)   If you are unable to reach your doctor, you may contact:   -Cardiology Office at [facility] at [phone] or   - [facility] [phone]  - [facility] [phone]

## 2023-01-03 NOTE — ASU DISCHARGE PLAN (ADULT/PEDIATRIC) - NS MD DC FALL RISK RISK
For information on Fall & Injury Prevention, visit: [email or web address] OR  [email or web address] OR  https://www.cdc.gov/steadi/patient.html

## 2023-01-03 NOTE — ASU DISCHARGE PLAN (ADULT/PEDIATRIC) - CARE PROVIDER_API CALL
Cameron Moran  CARDIOVASCULAR DISEASE  [address] [place] [address]  Phone: [phone]  Fax: [phone]  Follow Up Time: 2 weeks

## 2023-01-03 NOTE — H&P CARDIOLOGY - HISTORY OF PRESENT ILLNESS
67 year old male with PMHx of CAD s/p multiple PCIs (5/2011 LIU x1 to RPDA, x1 to prox RCA, 6/2011 x1 to mLAD, x1 to D1, 11/2016 x1 to OM1), HTN, HLD, T2DM, CRISS on CPAP, PAF s/p ablation on Xarelto (last dose 1/1 PM), presented to his cardiologist office Dr. Moran with c/o SOB on exertion and had an abnormal stress test showing moderate intensity defect of the base, mid and apical inferior wall which is completely reversible consistent with RCA disease, mild LV dysfunction with inferior wall hypokinesis, EF 42%. Patient now presents for [facility] w/ Dr. Ballesteros today. Currently denies chest pain, palpitations, dizziness, SOB.     Cards: Yo Moran    [facility]  Patient: EVIE SMITH  Study date: 05/31/2011  Case Physician(s):  Alexy Ballesteros M.D.  Indications: Angina/MI: stable angina. Coronary artery disease: abnormal  stress test.  History: There was no prior cardiac history. The patient has hypertension  and morbid obesity.  Procedure:  Left coronary angiography.  Right coronary angiography.  Left heart catheterization with ventriculography.  Sheath Exchange for Intervention.  Intervention on right PDA: stent.  Intervention on proximal RCA: stent.    [facility]  Patient: EVIE BERNALTIMO  Study date: 06/01/2011  Case Physician(s):  Alexy Ballesteros M.D.  Indications: Angina/MI: unstable angina. Coronary artery disease: abnormal  stress test.  History: The patient has a history of coronary artery disease. The patient  has hypertension and medication-treated hypercholesterolemia.  Procedure:  Intervention on mid LAD: stent.  Intervention on D1: stent.    [facility]  Patient: EVIENEFTALI SMITH  Study date: 11/11/2016  Case Physician(s):  Alexy Ballesteros M.D.  INDICATIONS: Initial NSTEMI.  HISTORY: The patient has a history of coronary artery disease. The patient  has hypertension, medication-treated dyslipidemia, and morbid obesity.  PROCEDURE:  --  Left heart catheterization with ventriculography.  --  Left coronary angiography.  --  Right coronary angiography.  --  Intervention on OM1: drug-eluting stent.

## 2023-01-04 RX ORDER — ASPIRIN/CALCIUM CARB/MAGNESIUM 324 MG
1 TABLET ORAL
Qty: 7 | Refills: 0
Start: 2023-01-04 | End: 2023-01-10

## 2023-01-04 NOTE — CHART NOTE - NSCHARTNOTEFT_GEN_A_CORE
Called patient and verified that he will be on triple therapy and taking Aspirin, Plavix and Xarelto together, and then stopping aspirin after 7 days on 1/11. After that, he will continue only Plavix and Xarelto.    Lázaro Browne [facility]  Invasive Cardiology  Ext [phone]

## 2023-03-25 NOTE — PATIENT PROFILE ADULT - BRADEN SCORE
79M with pmhx CAD s/p PCI, Afib on eliquis, mod obesity, htn, hld, dm2, isaias not on CPAP, thoracic aortic aneurysm, BPH s/p TURP, left renal mass, recent hMPV infection, presented from [facility] to ED s/p fall. Found down there hypoxemic and obtunded. On arrival to the ED he was found to be in acute on chronic hypercapnic respiratory failure and started on NIV. Given approx. 700cc of IVF but ultimately required initiation on norepi. His respiratory status worsened and he required intubation. ICU called for admission. Now extubated and is being treated for acute hypoxemic and hypercapnic respiratory failure and septic shock 2/2 multifocal pna. 21

## 2023-12-19 NOTE — DISCHARGE NOTE PROVIDER - PROVIDER RX CONTACT NUMBER
[phone] Bed in lowest position, wheels locked, appropriate side rails in place/Call bell, personal items and telephone in reach/Instruct patient to call for assistance before getting out of bed or chair/Non-slip footwear when patient is out of bed/[place] to call system/Physically safe environment - no spills, clutter or unnecessary equipment/Purposeful Proactive Rounding/Room/bathroom lighting operational, light cord in reach Bed in lowest position, wheels locked, appropriate side rails in place/Call bell, personal items and telephone in reach/Instruct patient to call for assistance before getting out of bed or chair/Non-slip footwear when patient is out of bed/[place] to call system/Physically safe environment - no spills, clutter or unnecessary equipment/Purposeful Proactive Rounding/Room/bathroom lighting operational, light cord in reach